# Patient Record
Sex: MALE | Race: WHITE | NOT HISPANIC OR LATINO | Employment: FULL TIME | URBAN - METROPOLITAN AREA
[De-identification: names, ages, dates, MRNs, and addresses within clinical notes are randomized per-mention and may not be internally consistent; named-entity substitution may affect disease eponyms.]

---

## 2023-11-07 ENCOUNTER — PHARMACY VISIT (OUTPATIENT)
Dept: PHARMACY | Facility: CLINIC | Age: 53
End: 2023-11-07
Payer: MEDICARE

## 2023-11-07 PROCEDURE — RXMED WILLOW AMBULATORY MEDICATION CHARGE

## 2023-11-07 RX ORDER — SEMAGLUTIDE 0.68 MG/ML
0.25 INJECTION, SOLUTION SUBCUTANEOUS
Qty: 3 ML | Refills: 1 | OUTPATIENT
Start: 2023-11-07 | End: 2024-01-08

## 2023-12-05 ENCOUNTER — PHARMACY VISIT (OUTPATIENT)
Dept: PHARMACY | Facility: CLINIC | Age: 53
End: 2023-12-05
Payer: COMMERCIAL

## 2023-12-05 PROCEDURE — RXMED WILLOW AMBULATORY MEDICATION CHARGE

## 2023-12-05 RX ORDER — BLOOD-GLUCOSE SENSOR
EACH MISCELLANEOUS
Qty: 2 EACH | Refills: 2 | OUTPATIENT
Start: 2023-12-05 | End: 2024-01-08

## 2023-12-13 PROCEDURE — RXMED WILLOW AMBULATORY MEDICATION CHARGE

## 2023-12-18 ENCOUNTER — PHARMACY VISIT (OUTPATIENT)
Dept: PHARMACY | Facility: CLINIC | Age: 53
End: 2023-12-18
Payer: MEDICARE

## 2023-12-27 PROCEDURE — RXMED WILLOW AMBULATORY MEDICATION CHARGE

## 2023-12-28 ENCOUNTER — PHARMACY VISIT (OUTPATIENT)
Dept: PHARMACY | Facility: CLINIC | Age: 53
End: 2023-12-28
Payer: COMMERCIAL

## 2024-01-15 ENCOUNTER — PHARMACY VISIT (OUTPATIENT)
Dept: PHARMACY | Facility: CLINIC | Age: 54
End: 2024-01-15
Payer: COMMERCIAL

## 2024-01-15 PROCEDURE — RXMED WILLOW AMBULATORY MEDICATION CHARGE

## 2024-01-15 RX ORDER — BLOOD-GLUCOSE SENSOR
EACH MISCELLANEOUS
Qty: 2 EACH | Refills: 2 | OUTPATIENT
Start: 2024-01-15 | End: 2024-03-08

## 2024-01-15 RX ORDER — SEMAGLUTIDE 0.68 MG/ML
INJECTION, SOLUTION SUBCUTANEOUS
Qty: 9 ML | Refills: 1 | OUTPATIENT
Start: 2024-01-15 | End: 2024-04-25 | Stop reason: ALTCHOICE

## 2024-02-09 DIAGNOSIS — N52.1 ERECTILE DYSFUNCTION DUE TO DISEASES CLASSIFIED ELSEWHERE: Primary | ICD-10-CM

## 2024-02-10 PROCEDURE — RXMED WILLOW AMBULATORY MEDICATION CHARGE

## 2024-02-12 RX ORDER — TADALAFIL 10 MG/1
TABLET ORAL
Qty: 10 TABLET | Refills: 0 | Status: SHIPPED | OUTPATIENT
Start: 2024-02-12 | End: 2024-04-25

## 2024-02-14 ENCOUNTER — PHARMACY VISIT (OUTPATIENT)
Dept: PHARMACY | Facility: CLINIC | Age: 54
End: 2024-02-14
Payer: COMMERCIAL

## 2024-02-14 PROCEDURE — RXMED WILLOW AMBULATORY MEDICATION CHARGE

## 2024-02-14 RX ORDER — SEMAGLUTIDE 1.34 MG/ML
1 INJECTION, SOLUTION SUBCUTANEOUS
Qty: 3 ML | Refills: 1 | OUTPATIENT
Start: 2024-02-14 | End: 2024-03-08

## 2024-03-08 ENCOUNTER — PHARMACY VISIT (OUTPATIENT)
Dept: PHARMACY | Facility: CLINIC | Age: 54
End: 2024-03-08
Payer: MEDICARE

## 2024-03-08 PROCEDURE — RXMED WILLOW AMBULATORY MEDICATION CHARGE

## 2024-03-08 RX ORDER — BLOOD-GLUCOSE SENSOR
EACH MISCELLANEOUS
Qty: 6 EACH | Refills: 3 | OUTPATIENT
Start: 2024-03-08

## 2024-03-08 RX ORDER — SEMAGLUTIDE 2.68 MG/ML
INJECTION, SOLUTION SUBCUTANEOUS
Qty: 6 ML | Refills: 1 | OUTPATIENT
Start: 2024-03-08

## 2024-03-18 ENCOUNTER — TELEPHONE (OUTPATIENT)
Dept: SPORTS MEDICINE | Facility: CLINIC | Age: 54
End: 2024-03-18
Payer: COMMERCIAL

## 2024-03-18 NOTE — TELEPHONE ENCOUNTER
The following Rx was called into patient's pharmacy per Dr. Li: Amoxicillin 500 mg take 1 pill 2x/day x 10 days, dispense 20, 1 refill. Patient notified.

## 2024-04-08 ENCOUNTER — APPOINTMENT (OUTPATIENT)
Dept: CARDIOLOGY | Facility: CLINIC | Age: 54
End: 2024-04-08

## 2024-04-25 ENCOUNTER — OFFICE VISIT (OUTPATIENT)
Dept: CARDIOLOGY | Facility: CLINIC | Age: 54
End: 2024-04-25
Payer: COMMERCIAL

## 2024-04-25 VITALS
WEIGHT: 313 LBS | HEART RATE: 87 BPM | SYSTOLIC BLOOD PRESSURE: 100 MMHG | OXYGEN SATURATION: 95 % | DIASTOLIC BLOOD PRESSURE: 70 MMHG | BODY MASS INDEX: 47.59 KG/M2

## 2024-04-25 DIAGNOSIS — I10 ESSENTIAL HYPERTENSION: ICD-10-CM

## 2024-04-25 DIAGNOSIS — I48.0 PAROXYSMAL ATRIAL FIBRILLATION (MULTI): Primary | ICD-10-CM

## 2024-04-25 DIAGNOSIS — E78.49 OTHER HYPERLIPIDEMIA: ICD-10-CM

## 2024-04-25 PROCEDURE — 3074F SYST BP LT 130 MM HG: CPT | Performed by: INTERNAL MEDICINE

## 2024-04-25 PROCEDURE — 99213 OFFICE O/P EST LOW 20 MIN: CPT | Performed by: INTERNAL MEDICINE

## 2024-04-25 PROCEDURE — 1036F TOBACCO NON-USER: CPT | Performed by: INTERNAL MEDICINE

## 2024-04-25 PROCEDURE — 3078F DIAST BP <80 MM HG: CPT | Performed by: INTERNAL MEDICINE

## 2024-04-25 RX ORDER — TRAMADOL HYDROCHLORIDE 50 MG/1
50 TABLET ORAL
COMMUNITY
Start: 2021-03-24

## 2024-04-25 RX ORDER — VALSARTAN AND HYDROCHLOROTHIAZIDE 320; 25 MG/1; MG/1
1 TABLET, FILM COATED ORAL DAILY
COMMUNITY
Start: 2024-03-08

## 2024-04-25 RX ORDER — AMLODIPINE BESYLATE 5 MG/1
5 TABLET ORAL DAILY
COMMUNITY

## 2024-04-25 RX ORDER — ASPIRIN 81 MG/1
81 TABLET ORAL DAILY
COMMUNITY

## 2024-04-25 RX ORDER — FUROSEMIDE 20 MG/1
20 TABLET ORAL DAILY
COMMUNITY
Start: 2024-04-08

## 2024-04-25 RX ORDER — METOPROLOL SUCCINATE 100 MG/1
100 TABLET, EXTENDED RELEASE ORAL DAILY
COMMUNITY
End: 2024-05-29 | Stop reason: SDUPTHER

## 2024-04-25 RX ORDER — ATORVASTATIN CALCIUM 40 MG/1
40 TABLET, FILM COATED ORAL DAILY
COMMUNITY
Start: 2024-03-08

## 2024-04-25 RX ORDER — TAMSULOSIN HYDROCHLORIDE 0.4 MG/1
0.4 CAPSULE ORAL DAILY
COMMUNITY
Start: 2023-10-10

## 2024-04-25 RX ORDER — PROPAFENONE HYDROCHLORIDE 150 MG/1
150 TABLET, COATED ORAL DAILY
COMMUNITY
End: 2024-05-22 | Stop reason: SDUPTHER

## 2024-04-25 RX ORDER — ALLOPURINOL 100 MG/1
100 TABLET ORAL
COMMUNITY
Start: 2024-03-08

## 2024-04-25 ASSESSMENT — ENCOUNTER SYMPTOMS
FEVER: 0
IRREGULAR HEARTBEAT: 0
CLAUDICATION: 0
DIAPHORESIS: 0
COUGH: 0
DIZZINESS: 0
ORTHOPNEA: 0
SHORTNESS OF BREATH: 0
MYALGIAS: 0
WEAKNESS: 0
WHEEZING: 0
WEIGHT GAIN: 0
PND: 0
DYSPNEA ON EXERTION: 0
SYNCOPE: 0
NEAR-SYNCOPE: 0
PALPITATIONS: 0
WEIGHT LOSS: 0

## 2024-04-25 ASSESSMENT — PAIN SCALES - GENERAL: PAINLEVEL: 0-NO PAIN

## 2024-04-25 NOTE — ASSESSMENT & PLAN NOTE
Stable. Continue rhythm control. Hgb A1c in October was 7.8, down to 5.8 recently with wt loss and ozempic. Nonetheless is diabetic as of Oct 2023. [id number] is 2. Will resume Eliquis

## 2024-04-25 NOTE — PROGRESS NOTES
Subjective      Chief Complaint   Patient presents with    6 month f/u        54 yo male with h/o HTN and PAF presents for f/u. He is at substantial risk for atherosclerotic disease. We elected to perform a coronary artery calcium score with the thought of continuing oral anticoagulation with a chads 2 Vasc score of 2 if his coronary artery calcium score was substantial. It was 12, we stopped AC. When I saw him last in May, he was having more atrial fibrillation. We placed him on Propafenone with plans for electrical cardioversion if necessary. I saw him last in September he was doing well. He still works, umpires baseball and softball. No limitations         Review of Systems   Constitutional: Negative for diaphoresis, fever, weight gain and weight loss.   Eyes:  Negative for visual disturbance.   Cardiovascular:  Negative for chest pain, claudication, dyspnea on exertion, irregular heartbeat, leg swelling, near-syncope, orthopnea, palpitations, paroxysmal nocturnal dyspnea and syncope.   Respiratory:  Negative for cough, shortness of breath and wheezing.    Musculoskeletal:  Negative for muscle weakness and myalgias.   Neurological:  Negative for dizziness and weakness.   All other systems reviewed and are negative.       No past medical history on file.     No past surgical history on file.     Social History     Socioeconomic History    Marital status:      Spouse name: Not on file    Number of children: Not on file    Years of education: Not on file    Highest education level: Not on file   Occupational History    Not on file   Tobacco Use    Smoking status: Never    Smokeless tobacco: Never   Substance and Sexual Activity    Alcohol use: Not Currently    Drug use: Never    Sexual activity: Not on file   Other Topics Concern    Not on file   Social History Narrative    Not on file     Social Determinants of Health     Financial Resource Strain: Low Risk  (10/16/2023)    Received from [facility] [employer]     Overall Financial Resource Strain (CARDIA)     Difficulty of Paying Living Expenses: Not hard at all   Food Insecurity: No Food Insecurity (10/16/2023)    Received from [facility]    Hunger Vital Sign     Worried About Running Out of Food in the Last Year: Never true     Ran Out of Food in the Last Year: Never true   Transportation Needs: No Transportation Needs (10/16/2023)    Received from [facility]    PRAPARE - Transportation     Lack of Transportation (Medical): No     Lack of Transportation (Non-Medical): No   Physical Activity: Insufficiently Active (10/16/2023)    Received from [facility]    Exercise Vital Sign     Days of Exercise per Week: 1 day     Minutes of Exercise per Session: 30 min   Stress: No Stress Concern Present (10/16/2023)    Received from [facility]    [employer] [place] [employer] - Occupational Stress Questionnaire     Feeling of Stress : Not at all   Social Connections: Moderately Integrated (10/16/2023)    Received from [facility]    Social Connection and Isolation Panel [NHANES]     Frequency of Communication with Friends and Family: Three times a week     Frequency of Social Gatherings with Friends and Family: Once a week     Attends Hoahaoism Services: More than 4 times per year     Active Member of Clubs or Organizations: Yes     Attends Club or Organization Meetings: 1 to 4 times per year     Marital Status:    Intimate Partner Violence: Not At Risk (10/16/2023)    Received from [facility]    Humiliation, Afraid, Rape, and Kick questionnaire     Fear of Current or Ex-Partner: No     Emotionally Abused: No     Physically Abused: No     Sexually Abused: No   Housing Stability: Low Risk  (10/16/2023)    Received from [facility]    Housing Stability Vital Sign     Unable to Pay for Housing in the Last Year: No     Number of Places Lived in the Last Year: 1     Unstable Housing in the Last Year: No        No family history on file.      OBJECTIVE:    Vitals:    04/25/24 0857   BP: 100/70   Pulse: 87   SpO2: 95%        Vitals reviewed.   Constitutional:       Appearance: Normal and healthy appearance. Not in distress.   Pulmonary:      Effort: Pulmonary effort is normal.      Breath sounds: Normal breath sounds.   Cardiovascular:      Normal rate. Regular rhythm. Normal S1. Normal S2.       Murmurs: There is no murmur.      No gallop.  No click.   Pulses:     Intact distal pulses.   Edema:     Peripheral edema absent.   Skin:     General: Skin is warm and dry.   Neurological:      General: No focal deficit present.          Lab Review:   Lab Results   Component Value Date     01/29/2023    K 3.9 01/29/2023     01/29/2023    CO2 23 (L) 01/29/2023    BUN 22 01/29/2023    CREATININE 0.8 01/29/2023    GLUCOSE 148 (H) 01/29/2023    CALCIUM 9.2 01/29/2023     Lab Results   Component Value Date    CHOL 146 12/16/2019    TRIG 114 12/16/2019    HDL 34 (L) 12/16/2019       Lab Results   Component Value Date    LDLCALC 89 12/16/2019        Paroxysmal atrial fibrillation (Multi)  Stable. Continue rhythm control. Hgb A1c in October was 7.8, down to 5.8 recently with wt loss and ozempic. Nonetheless is diabetic as of Oct 2023. [id number] is 2. Will resume Eliquis    Essential hypertension  Controlled. Continue current medical therapy

## 2024-05-07 PROCEDURE — RXMED WILLOW AMBULATORY MEDICATION CHARGE

## 2024-05-16 ENCOUNTER — PHARMACY VISIT (OUTPATIENT)
Dept: PHARMACY | Facility: CLINIC | Age: 54
End: 2024-05-16
Payer: MEDICARE

## 2024-05-22 DIAGNOSIS — I48.0 PAROXYSMAL ATRIAL FIBRILLATION (MULTI): Primary | ICD-10-CM

## 2024-05-22 RX ORDER — PROPAFENONE HYDROCHLORIDE 150 MG/1
150 TABLET, COATED ORAL DAILY
Qty: 90 TABLET | Refills: 3 | Status: SHIPPED | OUTPATIENT
Start: 2024-05-22 | End: 2025-05-22

## 2024-05-29 DIAGNOSIS — I48.0 PAROXYSMAL ATRIAL FIBRILLATION (MULTI): Primary | ICD-10-CM

## 2024-05-29 RX ORDER — METOPROLOL SUCCINATE 100 MG/1
100 TABLET, EXTENDED RELEASE ORAL DAILY
Qty: 90 TABLET | Refills: 3 | Status: SHIPPED | OUTPATIENT
Start: 2024-05-29

## 2024-06-12 PROCEDURE — RXMED WILLOW AMBULATORY MEDICATION CHARGE

## 2024-06-13 ENCOUNTER — PHARMACY VISIT (OUTPATIENT)
Dept: PHARMACY | Facility: CLINIC | Age: 54
End: 2024-06-13
Payer: MEDICARE

## 2024-06-13 RX ORDER — FUROSEMIDE 20 MG/1
20 TABLET ORAL DAILY
Qty: 90 TABLET | Refills: 0 | OUTPATIENT
Start: 2024-06-13

## 2024-06-13 RX ORDER — BLOOD-GLUCOSE SENSOR
EACH MISCELLANEOUS
Qty: 6 EACH | Refills: 3 | OUTPATIENT
Start: 2024-06-13

## 2024-07-05 PROCEDURE — RXMED WILLOW AMBULATORY MEDICATION CHARGE

## 2024-07-06 ENCOUNTER — PHARMACY VISIT (OUTPATIENT)
Dept: PHARMACY | Facility: CLINIC | Age: 54
End: 2024-07-06
Payer: MEDICARE

## 2024-07-11 ENCOUNTER — TELEPHONE (OUTPATIENT)
Dept: SPORTS MEDICINE | Facility: CLINIC | Age: 54
End: 2024-07-11
Payer: COMMERCIAL

## 2024-07-11 NOTE — TELEPHONE ENCOUNTER
Called in Amoxicillin 500 mg take one pill twice a day x 10 days dispense 20 pills, 1 refill to patient's preferred pharmacy ([facility] per Dr. Li. Patient notified of above.

## 2024-07-16 PROCEDURE — RXMED WILLOW AMBULATORY MEDICATION CHARGE

## 2024-07-17 ENCOUNTER — PHARMACY VISIT (OUTPATIENT)
Dept: PHARMACY | Facility: CLINIC | Age: 54
End: 2024-07-17
Payer: MEDICARE

## 2024-09-05 ENCOUNTER — PHARMACY VISIT (OUTPATIENT)
Dept: PHARMACY | Facility: CLINIC | Age: 54
End: 2024-09-05
Payer: MEDICARE

## 2024-09-05 PROCEDURE — RXMED WILLOW AMBULATORY MEDICATION CHARGE

## 2024-09-06 ENCOUNTER — PHARMACY VISIT (OUTPATIENT)
Dept: PHARMACY | Facility: CLINIC | Age: 54
End: 2024-09-06

## 2024-10-02 PROCEDURE — RXMED WILLOW AMBULATORY MEDICATION CHARGE

## 2024-10-04 ENCOUNTER — PHARMACY VISIT (OUTPATIENT)
Dept: PHARMACY | Facility: CLINIC | Age: 54
End: 2024-10-04
Payer: MEDICARE

## 2024-11-04 DIAGNOSIS — J32.9 CHRONIC RECURRENT SINUSITIS: Primary | ICD-10-CM

## 2024-11-04 RX ORDER — AMOXICILLIN 500 MG/1
500 TABLET, FILM COATED ORAL EVERY 12 HOURS SCHEDULED
Qty: 20 TABLET | Refills: 0 | Status: SHIPPED | OUTPATIENT
Start: 2024-11-04 | End: 2024-11-14

## 2024-11-04 NOTE — PROGRESS NOTES
History of chronic sinusitis recently flared up.  Went to the urgent care was given a Z-Zenon and did not help.  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  Posterior nasal drip noted.    Amoxicillin 500 mg 1 pill twice a day for 10 days dispense 20 with 1 refill    Will follow-up in the office or with PCP in 2 to 3 weeks   bones(Osteoporosis,prev fx,steroid use,metastatic bone ca) impairments found/functional limitations in following categories

## 2024-11-13 PROCEDURE — RXMED WILLOW AMBULATORY MEDICATION CHARGE

## 2024-11-15 PROCEDURE — RXMED WILLOW AMBULATORY MEDICATION CHARGE

## 2024-11-19 ENCOUNTER — OFFICE VISIT (OUTPATIENT)
Dept: CARDIOLOGY | Facility: CLINIC | Age: 54
End: 2024-11-19
Payer: COMMERCIAL

## 2024-11-19 VITALS
BODY MASS INDEX: 49.26 KG/M2 | OXYGEN SATURATION: 97 % | WEIGHT: 315 LBS | HEART RATE: 88 BPM | SYSTOLIC BLOOD PRESSURE: 122 MMHG | DIASTOLIC BLOOD PRESSURE: 80 MMHG

## 2024-11-19 DIAGNOSIS — I48.0 PAROXYSMAL ATRIAL FIBRILLATION (MULTI): ICD-10-CM

## 2024-11-19 DIAGNOSIS — I10 ESSENTIAL HYPERTENSION: Primary | ICD-10-CM

## 2024-11-19 DIAGNOSIS — E78.49 OTHER HYPERLIPIDEMIA: ICD-10-CM

## 2024-11-19 PROCEDURE — 99214 OFFICE O/P EST MOD 30 MIN: CPT | Performed by: INTERNAL MEDICINE

## 2024-11-19 PROCEDURE — 1036F TOBACCO NON-USER: CPT | Performed by: INTERNAL MEDICINE

## 2024-11-19 PROCEDURE — 3074F SYST BP LT 130 MM HG: CPT | Performed by: INTERNAL MEDICINE

## 2024-11-19 PROCEDURE — 93005 ELECTROCARDIOGRAM TRACING: CPT | Performed by: INTERNAL MEDICINE

## 2024-11-19 PROCEDURE — 3079F DIAST BP 80-89 MM HG: CPT | Performed by: INTERNAL MEDICINE

## 2024-11-19 PROCEDURE — 93010 ELECTROCARDIOGRAM REPORT: CPT | Performed by: INTERNAL MEDICINE

## 2024-11-19 RX ORDER — AMLODIPINE BESYLATE 5 MG/1
5 TABLET ORAL DAILY
Qty: 90 TABLET | Refills: 3 | Status: SHIPPED | OUTPATIENT
Start: 2024-11-19

## 2024-11-19 ASSESSMENT — ENCOUNTER SYMPTOMS
DIAPHORESIS: 0
LOSS OF SENSATION IN FEET: 0
WEIGHT GAIN: 0
COUGH: 0
WHEEZING: 0
SYNCOPE: 0
CLAUDICATION: 0
DYSPNEA ON EXERTION: 0
SHORTNESS OF BREATH: 0
WEAKNESS: 0
PALPITATIONS: 0
DEPRESSION: 0
OCCASIONAL FEELINGS OF UNSTEADINESS: 0
NEAR-SYNCOPE: 0
MYALGIAS: 0
ORTHOPNEA: 0
IRREGULAR HEARTBEAT: 0
FEVER: 0
WEIGHT LOSS: 0
DIZZINESS: 0
PND: 0

## 2024-11-19 ASSESSMENT — PAIN SCALES - GENERAL: PAINLEVEL_OUTOF10: 0-NO PAIN

## 2024-11-19 ASSESSMENT — PATIENT HEALTH QUESTIONNAIRE - PHQ9
2. FEELING DOWN, DEPRESSED OR HOPELESS: NOT AT ALL
SUM OF ALL RESPONSES TO PHQ9 QUESTIONS 1 AND 2: 0
1. LITTLE INTEREST OR PLEASURE IN DOING THINGS: NOT AT ALL

## 2024-11-19 NOTE — PROGRESS NOTES
Subjective      Chief Complaint   Patient presents with   • Follow-up        55 yo male with h/o HTN and PAF presents for f/u. He is at substantial risk for atherosclerotic disease. We elected to perform a coronary artery calcium score with the thought of continuing oral anticoagulation with a chads 2 Vasc score of 2 if his coronary artery calcium score was substantial. It was 12, we stopped AC.  We started him on propafenone for rhythm control, he was last seen in September 2023 doing well from a cardiac standpoint.       Review of Systems   Constitutional: Negative for diaphoresis, fever, weight gain and weight loss.   Eyes:  Negative for visual disturbance.   Cardiovascular:  Negative for chest pain, claudication, dyspnea on exertion, irregular heartbeat, leg swelling, near-syncope, orthopnea, palpitations, paroxysmal nocturnal dyspnea and syncope.   Respiratory:  Negative for cough, shortness of breath and wheezing.    Musculoskeletal:  Negative for muscle weakness and myalgias.   Neurological:  Negative for dizziness and weakness.   All other systems reviewed and are negative.       History reviewed. No pertinent past medical history.     History reviewed. No pertinent surgical history.     Social History     Socioeconomic History   • Marital status:      Spouse name: Not on file   • Number of children: Not on file   • Years of education: Not on file   • Highest education level: Not on file   Occupational History   • Not on file   Tobacco Use   • Smoking status: Never   • Smokeless tobacco: Never   Substance and Sexual Activity   • Alcohol use: Not Currently   • Drug use: Never   • Sexual activity: Not on file   Other Topics Concern   • Not on file   Social History Narrative   • Not on file     Social Drivers of Health     Financial Resource Strain: Low Risk  (10/16/2023)    Received from [facility]    Overall Financial Resource Strain (CARDIA)    • Difficulty of Paying Living  Expenses: Not hard at all   Food Insecurity: No Food Insecurity (10/16/2023)    [facility]    Hunger Vital Sign    • Worried About Running Out of Food in the Last Year: Never true    • Ran Out of Food in the Last Year: Never true   Transportation Needs: No Transportation Needs (10/16/2023)    [facility]    PRAPARE - Transportation    • Lack of Transportation (Medical): No    • Lack of Transportation (Non-Medical): No   Physical Activity: Insufficiently Active (10/16/2023)    [facility]    Exercise Vital Sign    • Days of Exercise per Week: 1 day    • Minutes of Exercise per Session: 30 min   Stress: No Stress Concern Present (10/16/2023)    Received from [facility] [place] [facility] - Occupational Stress Questionnaire    • Feeling of Stress : Not at all   Social Connections: Moderately Integrated (10/16/2023)    [facility]    Social Connection and Isolation Panel [NHANES]    • Frequency of Communication with Friends and Family: Three times a week    • Frequency of Social Gatherings with Friends and Family: Once a week    • Attends [employer] Services: More than 4 times per year    • Active Member of Clubs or Organizations: Yes    • Attends Club or Organization Meetings: 1 to 4 times per year    • Marital Status:    Intimate Partner Violence: Not At Risk (10/16/2023)    [facility]    Humiliation, Afraid, Rape, and Kick questionnaire    • Fear of Current or Ex-Partner: No    • Emotionally Abused: No    • Physically Abused: No    • Sexually Abused: No   Housing Stability: Low Risk  (10/16/2023)    [facility]    Housing Stability Vital Sign    • Unable to Pay for Housing in the Last Year: No    • Number of Places Lived in the Last Year: 1    • Unstable  Housing in the Last Year: No        No family history on file.     OBJECTIVE:    Vitals:    11/19/24 1303   BP: 122/80   Pulse: 88   SpO2: 97%        Vitals reviewed.   Constitutional:       Appearance: Normal and healthy appearance. Not in distress.   Pulmonary:      Effort: Pulmonary effort is normal.      Breath sounds: Normal breath sounds.   Cardiovascular:      Normal rate. Regular rhythm. Normal S1. Normal S2.       Murmurs: There is no murmur.      No gallop.  No click.   Pulses:     Intact distal pulses.   Edema:     Peripheral edema absent.   Skin:     General: Skin is warm and dry.   Neurological:      General: No focal deficit present.        Lab Review:   Lab Results   Component Value Date     01/29/2023    K 3.9 01/29/2023     01/29/2023    CO2 23 (L) 01/29/2023    BUN 22 01/29/2023    CREATININE 0.8 01/29/2023    GLUCOSE 148 (H) 01/29/2023    CALCIUM 9.2 01/29/2023     Lab Results   Component Value Date    CHOL 146 12/16/2019    TRIG 114 12/16/2019    HDL 34 (L) 12/16/2019       Lab Results   Component Value Date    LDLCALC 89 12/16/2019        Essential hypertension  Controlled. Continue norvasc    Paroxysmal atrial fibrillation (Multi)  Doing well with rhythm control. Checking an ECG. Continue propafenone and eliquis for oral AC

## 2024-11-27 ENCOUNTER — PHARMACY VISIT (OUTPATIENT)
Dept: PHARMACY | Facility: CLINIC | Age: 54
End: 2024-11-27
Payer: MEDICARE

## 2024-12-03 ENCOUNTER — APPOINTMENT (OUTPATIENT)
Dept: CARDIOLOGY | Facility: CLINIC | Age: 54
End: 2024-12-03
Payer: COMMERCIAL

## 2025-01-15 PROCEDURE — RXMED WILLOW AMBULATORY MEDICATION CHARGE

## 2025-01-21 ENCOUNTER — PHARMACY VISIT (OUTPATIENT)
Dept: PHARMACY | Facility: CLINIC | Age: 55
End: 2025-01-21
Payer: MEDICARE

## 2025-02-04 PROCEDURE — RXMED WILLOW AMBULATORY MEDICATION CHARGE

## 2025-02-05 ENCOUNTER — PHARMACY VISIT (OUTPATIENT)
Dept: PHARMACY | Facility: CLINIC | Age: 55
End: 2025-02-05
Payer: MEDICARE

## 2025-02-16 PROCEDURE — RXMED WILLOW AMBULATORY MEDICATION CHARGE

## 2025-02-17 DIAGNOSIS — N52.8 OTHER MALE ERECTILE DYSFUNCTION: Primary | ICD-10-CM

## 2025-02-17 RX ORDER — TADALAFIL 10 MG/1
10 TABLET ORAL DAILY PRN
Qty: 30 TABLET | Refills: 11 | Status: SHIPPED | OUTPATIENT
Start: 2025-02-17 | End: 2025-03-19

## 2025-02-17 NOTE — PROGRESS NOTES
Patient called and says he needs a refill on his Cialis to be used as needed.    Cialis take 1/2-1 pill as needed at least 30 minutes before sexual activity  Dispense 30 with 12 refills

## 2025-02-19 ENCOUNTER — PHARMACY VISIT (OUTPATIENT)
Dept: PHARMACY | Facility: CLINIC | Age: 55
End: 2025-02-19
Payer: MEDICARE

## 2025-03-03 PROCEDURE — RXMED WILLOW AMBULATORY MEDICATION CHARGE

## 2025-03-05 ENCOUNTER — PHARMACY VISIT (OUTPATIENT)
Dept: PHARMACY | Facility: CLINIC | Age: 55
End: 2025-03-05
Payer: MEDICARE

## 2025-03-05 RX ORDER — FUROSEMIDE 20 MG/1
20 TABLET ORAL DAILY
Qty: 30 TABLET | Refills: 2 | Status: CANCELLED | OUTPATIENT
Start: 2025-03-05

## 2025-03-13 RX ORDER — FUROSEMIDE 20 MG/1
20 TABLET ORAL DAILY
Qty: 30 TABLET | Refills: 2 | Status: CANCELLED | OUTPATIENT
Start: 2025-03-05

## 2025-04-01 PROCEDURE — RXMED WILLOW AMBULATORY MEDICATION CHARGE

## 2025-04-03 ENCOUNTER — PHARMACY VISIT (OUTPATIENT)
Dept: PHARMACY | Facility: CLINIC | Age: 55
End: 2025-04-03
Payer: MEDICARE

## 2025-04-28 PROCEDURE — RXMED WILLOW AMBULATORY MEDICATION CHARGE

## 2025-04-29 ENCOUNTER — PHARMACY VISIT (OUTPATIENT)
Dept: PHARMACY | Facility: CLINIC | Age: 55
End: 2025-04-29
Payer: MEDICARE

## 2025-05-14 PROCEDURE — RXMED WILLOW AMBULATORY MEDICATION CHARGE

## 2025-05-16 ENCOUNTER — PHARMACY VISIT (OUTPATIENT)
Dept: PHARMACY | Facility: CLINIC | Age: 55
End: 2025-05-16
Payer: COMMERCIAL

## 2025-05-19 DIAGNOSIS — I48.0 PAROXYSMAL ATRIAL FIBRILLATION (MULTI): ICD-10-CM

## 2025-05-20 RX ORDER — PROPAFENONE HYDROCHLORIDE 150 MG/1
150 TABLET, COATED ORAL DAILY
Qty: 90 TABLET | Refills: 3 | Status: SHIPPED | OUTPATIENT
Start: 2025-05-20 | End: 2026-05-20

## 2025-06-06 PROCEDURE — RXMED WILLOW AMBULATORY MEDICATION CHARGE

## 2025-06-09 ENCOUNTER — PHARMACY VISIT (OUTPATIENT)
Dept: PHARMACY | Facility: CLINIC | Age: 55
End: 2025-06-09
Payer: COMMERCIAL

## 2025-06-17 ENCOUNTER — PHARMACY VISIT (OUTPATIENT)
Dept: PHARMACY | Facility: CLINIC | Age: 55
End: 2025-06-17
Payer: COMMERCIAL

## 2025-06-17 PROCEDURE — RXMED WILLOW AMBULATORY MEDICATION CHARGE

## 2025-06-19 ENCOUNTER — APPOINTMENT (OUTPATIENT)
Dept: CARDIOLOGY | Facility: HOSPITAL | Age: 55
DRG: 309 | End: 2025-06-19
Payer: MEDICARE

## 2025-06-19 ENCOUNTER — APPOINTMENT (OUTPATIENT)
Dept: RADIOLOGY | Facility: HOSPITAL | Age: 55
DRG: 309 | End: 2025-06-19
Payer: MEDICARE

## 2025-06-19 ENCOUNTER — HOSPITAL ENCOUNTER (INPATIENT)
Facility: HOSPITAL | Age: 55
LOS: 1 days | Discharge: HOME | DRG: 309 | End: 2025-06-20
Attending: EMERGENCY MEDICINE | Admitting: INTERNAL MEDICINE
Payer: MEDICARE

## 2025-06-19 DIAGNOSIS — I48.0 PAROXYSMAL ATRIAL FIBRILLATION (MULTI): ICD-10-CM

## 2025-06-19 DIAGNOSIS — I10 DIASTOLIC HYPERTENSION: ICD-10-CM

## 2025-06-19 DIAGNOSIS — R07.9 CHEST PAIN, UNSPECIFIED TYPE: ICD-10-CM

## 2025-06-19 DIAGNOSIS — R06.00 DYSPNEA, UNSPECIFIED TYPE: ICD-10-CM

## 2025-06-19 DIAGNOSIS — I48.91 ATRIAL FIBRILLATION WITH RAPID VENTRICULAR RESPONSE (MULTI): Primary | ICD-10-CM

## 2025-06-19 LAB
ALBUMIN SERPL BCP-MCNC: 3.9 G/DL (ref 3.4–5)
ALP SERPL-CCNC: 98 U/L (ref 33–120)
ALT SERPL W P-5'-P-CCNC: 49 U/L (ref 10–52)
ANION GAP SERPL CALCULATED.3IONS-SCNC: 12 MMOL/L (ref 10–20)
AORTIC VALVE PEAK VELOCITY: 0.99 M/S
AST SERPL W P-5'-P-CCNC: 35 U/L (ref 9–39)
AV PEAK GRADIENT: 4 MMHG
AVA (PEAK VEL): 3.29 CM2
BASOPHILS # BLD AUTO: 0.01 X10*3/UL (ref 0–0.1)
BASOPHILS NFR BLD AUTO: 0.1 %
BILIRUB SERPL-MCNC: 0.7 MG/DL (ref 0–1.2)
BNP SERPL-MCNC: 61 PG/ML (ref 0–99)
BUN SERPL-MCNC: 20 MG/DL (ref 6–23)
CALCIUM SERPL-MCNC: 8.8 MG/DL (ref 8.6–10.3)
CARDIAC TROPONIN I PNL SERPL HS: 7 NG/L (ref 0–20)
CARDIAC TROPONIN I PNL SERPL HS: 8 NG/L (ref 0–20)
CHLORIDE SERPL-SCNC: 103 MMOL/L (ref 98–107)
CO2 SERPL-SCNC: 28 MMOL/L (ref 21–32)
CREAT SERPL-MCNC: 1.04 MG/DL (ref 0.5–1.3)
D DIMER PPP FEU-MCNC: 0.2 MG/L FEU (ref 0.19–0.5)
EGFRCR SERPLBLD CKD-EPI 2021: 86 ML/MIN/1.73M*2
EJECTION FRACTION APICAL 4 CHAMBER: 47.2
EJECTION FRACTION: 68 %
EOSINOPHIL # BLD AUTO: 0.19 X10*3/UL (ref 0–0.7)
EOSINOPHIL NFR BLD AUTO: 2.2 %
ERYTHROCYTE [DISTWIDTH] IN BLOOD BY AUTOMATED COUNT: 14.2 % (ref 11.5–14.5)
GLUCOSE SERPL-MCNC: 136 MG/DL (ref 74–99)
HCT VFR BLD AUTO: 44.9 % (ref 41–52)
HGB BLD-MCNC: 14.8 G/DL (ref 13.5–17.5)
IMM GRANULOCYTES # BLD AUTO: 0.04 X10*3/UL (ref 0–0.7)
IMM GRANULOCYTES NFR BLD AUTO: 0.5 % (ref 0–0.9)
LACTATE SERPL-SCNC: 1.8 MMOL/L (ref 0.4–2)
LEFT ATRIUM VOLUME AREA LENGTH INDEX BSA: 26 ML/M2
LEFT VENTRICLE INTERNAL DIMENSION DIASTOLE: 4.35 CM (ref 3.5–6)
LEFT VENTRICULAR OUTFLOW TRACT DIAMETER: 2.36 CM
LV EJECTION FRACTION BIPLANE: 43 %
LYMPHOCYTES # BLD AUTO: 1.18 X10*3/UL (ref 1.2–4.8)
LYMPHOCYTES NFR BLD AUTO: 13.5 %
MCH RBC QN AUTO: 26.3 PG (ref 26–34)
MCHC RBC AUTO-ENTMCNC: 33 G/DL (ref 32–36)
MCV RBC AUTO: 80 FL (ref 80–100)
MONOCYTES # BLD AUTO: 0.79 X10*3/UL (ref 0.1–1)
MONOCYTES NFR BLD AUTO: 9.1 %
NEUTROPHILS # BLD AUTO: 6.51 X10*3/UL (ref 1.2–7.7)
NEUTROPHILS NFR BLD AUTO: 74.6 %
NRBC BLD-RTO: 0 /100 WBCS (ref 0–0)
PLATELET # BLD AUTO: 193 X10*3/UL (ref 150–450)
POTASSIUM SERPL-SCNC: 3.5 MMOL/L (ref 3.5–5.3)
PROT SERPL-MCNC: 6.6 G/DL (ref 6.4–8.2)
Q ONSET: 221 MS
QRS COUNT: 22 BEATS
QRS DURATION: 88 MS
QT INTERVAL: 310 MS
QTC CALCULATION(BAZETT): 465 MS
QTC FREDERICIA: 406 MS
R AXIS: 66 DEGREES
RBC # BLD AUTO: 5.63 X10*6/UL (ref 4.5–5.9)
RIGHT VENTRICLE PEAK SYSTOLIC PRESSURE: 21 MMHG
SODIUM SERPL-SCNC: 139 MMOL/L (ref 136–145)
T AXIS: 35 DEGREES
T OFFSET: 376 MS
VENTRICULAR RATE: 135 BPM
WBC # BLD AUTO: 8.7 X10*3/UL (ref 4.4–11.3)

## 2025-06-19 PROCEDURE — 2550000001 HC RX 255 CONTRASTS: Performed by: EMERGENCY MEDICINE

## 2025-06-19 PROCEDURE — 71275 CT ANGIOGRAPHY CHEST: CPT

## 2025-06-19 PROCEDURE — 83605 ASSAY OF LACTIC ACID: CPT | Performed by: EMERGENCY MEDICINE

## 2025-06-19 PROCEDURE — 84443 ASSAY THYROID STIM HORMONE: CPT | Performed by: REGISTERED NURSE

## 2025-06-19 PROCEDURE — 93306 TTE W/DOPPLER COMPLETE: CPT | Performed by: INTERNAL MEDICINE

## 2025-06-19 PROCEDURE — 74174 CTA ABD&PLVS W/CONTRAST: CPT | Performed by: RADIOLOGY

## 2025-06-19 PROCEDURE — 71045 X-RAY EXAM CHEST 1 VIEW: CPT

## 2025-06-19 PROCEDURE — 83735 ASSAY OF MAGNESIUM: CPT | Performed by: REGISTERED NURSE

## 2025-06-19 PROCEDURE — 84484 ASSAY OF TROPONIN QUANT: CPT | Performed by: EMERGENCY MEDICINE

## 2025-06-19 PROCEDURE — 99291 CRITICAL CARE FIRST HOUR: CPT | Mod: 25

## 2025-06-19 PROCEDURE — 2500000004 HC RX 250 GENERAL PHARMACY W/ HCPCS (ALT 636 FOR OP/ED): Performed by: EMERGENCY MEDICINE

## 2025-06-19 PROCEDURE — 83880 ASSAY OF NATRIURETIC PEPTIDE: CPT | Performed by: EMERGENCY MEDICINE

## 2025-06-19 PROCEDURE — 96376 TX/PRO/DX INJ SAME DRUG ADON: CPT

## 2025-06-19 PROCEDURE — 2500000004 HC RX 250 GENERAL PHARMACY W/ HCPCS (ALT 636 FOR OP/ED)

## 2025-06-19 PROCEDURE — 71275 CT ANGIOGRAPHY CHEST: CPT | Performed by: RADIOLOGY

## 2025-06-19 PROCEDURE — 85025 COMPLETE CBC W/AUTO DIFF WBC: CPT | Performed by: EMERGENCY MEDICINE

## 2025-06-19 PROCEDURE — 96375 TX/PRO/DX INJ NEW DRUG ADDON: CPT

## 2025-06-19 PROCEDURE — 82947 ASSAY GLUCOSE BLOOD QUANT: CPT

## 2025-06-19 PROCEDURE — 2500000001 HC RX 250 WO HCPCS SELF ADMINISTERED DRUGS (ALT 637 FOR MEDICARE OP): Performed by: NURSE PRACTITIONER

## 2025-06-19 PROCEDURE — 76376 3D RENDER W/INTRP POSTPROCES: CPT

## 2025-06-19 PROCEDURE — 99222 1ST HOSP IP/OBS MODERATE 55: CPT | Performed by: REGISTERED NURSE

## 2025-06-19 PROCEDURE — 36415 COLL VENOUS BLD VENIPUNCTURE: CPT | Performed by: EMERGENCY MEDICINE

## 2025-06-19 PROCEDURE — 84075 ASSAY ALKALINE PHOSPHATASE: CPT | Performed by: EMERGENCY MEDICINE

## 2025-06-19 PROCEDURE — 2500000004 HC RX 250 GENERAL PHARMACY W/ HCPCS (ALT 636 FOR OP/ED): Performed by: NURSE PRACTITIONER

## 2025-06-19 PROCEDURE — 99223 1ST HOSP IP/OBS HIGH 75: CPT | Performed by: NURSE PRACTITIONER

## 2025-06-19 PROCEDURE — 2500000001 HC RX 250 WO HCPCS SELF ADMINISTERED DRUGS (ALT 637 FOR MEDICARE OP): Performed by: EMERGENCY MEDICINE

## 2025-06-19 PROCEDURE — 2500000005 HC RX 250 GENERAL PHARMACY W/O HCPCS

## 2025-06-19 PROCEDURE — 2060000001 HC INTERMEDIATE ICU ROOM DAILY

## 2025-06-19 PROCEDURE — 93005 ELECTROCARDIOGRAM TRACING: CPT

## 2025-06-19 PROCEDURE — 96361 HYDRATE IV INFUSION ADD-ON: CPT

## 2025-06-19 PROCEDURE — 85300 ANTITHROMBIN III ACTIVITY: CPT | Performed by: EMERGENCY MEDICINE

## 2025-06-19 PROCEDURE — 96365 THER/PROPH/DIAG IV INF INIT: CPT

## 2025-06-19 RX ORDER — DILTIAZEM HCL/D5W 125 MG/125
5-15 PLASTIC BAG, INJECTION (ML) INTRAVENOUS CONTINUOUS
Status: DISCONTINUED | OUTPATIENT
Start: 2025-06-19 | End: 2025-06-20

## 2025-06-19 RX ORDER — HYDROCHLOROTHIAZIDE 25 MG/1
25 TABLET ORAL DAILY
Status: DISCONTINUED | OUTPATIENT
Start: 2025-06-20 | End: 2025-06-20 | Stop reason: HOSPADM

## 2025-06-19 RX ORDER — ASPIRIN 325 MG
325 TABLET ORAL ONCE
Status: COMPLETED | OUTPATIENT
Start: 2025-06-19 | End: 2025-06-19

## 2025-06-19 RX ORDER — ATORVASTATIN CALCIUM 40 MG/1
40 TABLET, FILM COATED ORAL DAILY
Status: DISCONTINUED | OUTPATIENT
Start: 2025-06-20 | End: 2025-06-20 | Stop reason: HOSPADM

## 2025-06-19 RX ORDER — PROPAFENONE HYDROCHLORIDE 150 MG/1
150 TABLET, COATED ORAL DAILY
Status: DISCONTINUED | OUTPATIENT
Start: 2025-06-20 | End: 2025-06-20

## 2025-06-19 RX ORDER — TAMSULOSIN HYDROCHLORIDE 0.4 MG/1
0.4 CAPSULE ORAL DAILY
Status: DISCONTINUED | OUTPATIENT
Start: 2025-06-20 | End: 2025-06-20 | Stop reason: HOSPADM

## 2025-06-19 RX ORDER — OMEPRAZOLE 20 MG/1
20 TABLET, DELAYED RELEASE ORAL
COMMUNITY

## 2025-06-19 RX ORDER — CYANOCOBALAMIN (VITAMIN B-12) 500 MCG
10 TABLET ORAL DAILY
COMMUNITY

## 2025-06-19 RX ORDER — MAGNESIUM SULFATE 1 G/100ML
1 INJECTION INTRAVENOUS ONCE
Status: COMPLETED | OUTPATIENT
Start: 2025-06-19 | End: 2025-06-19

## 2025-06-19 RX ORDER — DILTIAZEM HYDROCHLORIDE 5 MG/ML
10 INJECTION INTRAVENOUS ONCE
Status: COMPLETED | OUTPATIENT
Start: 2025-06-19 | End: 2025-06-19

## 2025-06-19 RX ORDER — MULTIVIT-MIN/IRON FUM/FOLIC AC 7.5 MG-4
1 TABLET ORAL DAILY
COMMUNITY

## 2025-06-19 RX ORDER — AMLODIPINE BESYLATE 5 MG/1
5 TABLET ORAL DAILY
Status: DISCONTINUED | OUTPATIENT
Start: 2025-06-20 | End: 2025-06-20 | Stop reason: HOSPADM

## 2025-06-19 RX ORDER — METOPROLOL SUCCINATE 100 MG/1
100 TABLET, EXTENDED RELEASE ORAL DAILY
Status: DISCONTINUED | OUTPATIENT
Start: 2025-06-20 | End: 2025-06-20 | Stop reason: HOSPADM

## 2025-06-19 RX ORDER — DILTIAZEM HCL/D5W 125 MG/125
5-15 PLASTIC BAG, INJECTION (ML) INTRAVENOUS CONTINUOUS
Status: DISCONTINUED | OUTPATIENT
Start: 2025-06-19 | End: 2025-06-19

## 2025-06-19 RX ORDER — VALSARTAN 160 MG/1
320 TABLET ORAL DAILY
Status: DISCONTINUED | OUTPATIENT
Start: 2025-06-20 | End: 2025-06-20 | Stop reason: HOSPADM

## 2025-06-19 RX ORDER — POTASSIUM CHLORIDE 1.5 G/1.58G
40 POWDER, FOR SOLUTION ORAL ONCE
Status: COMPLETED | OUTPATIENT
Start: 2025-06-19 | End: 2025-06-19

## 2025-06-19 RX ORDER — FUROSEMIDE 20 MG/1
20 TABLET ORAL DAILY
Status: DISCONTINUED | OUTPATIENT
Start: 2025-06-20 | End: 2025-06-20 | Stop reason: HOSPADM

## 2025-06-19 RX ORDER — FAMOTIDINE 10 MG/ML
20 INJECTION, SOLUTION INTRAVENOUS ONCE
Status: COMPLETED | OUTPATIENT
Start: 2025-06-19 | End: 2025-06-19

## 2025-06-19 RX ORDER — ALLOPURINOL 100 MG/1
100 TABLET ORAL DAILY
Status: DISCONTINUED | OUTPATIENT
Start: 2025-06-20 | End: 2025-06-20 | Stop reason: HOSPADM

## 2025-06-19 RX ADMIN — IOHEXOL 120 ML: 350 INJECTION, SOLUTION INTRAVENOUS at 10:34

## 2025-06-19 RX ADMIN — Medication 5 MG/HR: at 16:00

## 2025-06-19 RX ADMIN — IBUTILIDE FUMARATE 1 MG: 0.1 INJECTION, SOLUTION INTRAVENOUS at 12:41

## 2025-06-19 RX ADMIN — POTASSIUM CHLORIDE 40 MEQ: 1.5 POWDER, FOR SOLUTION ORAL at 12:00

## 2025-06-19 RX ADMIN — ASPIRIN 325 MG ORAL TABLET 325 MG: 325 PILL ORAL at 08:58

## 2025-06-19 RX ADMIN — FAMOTIDINE 20 MG: 10 INJECTION, SOLUTION INTRAVENOUS at 08:59

## 2025-06-19 RX ADMIN — DILTIAZEM HYDROCHLORIDE 10 MG: 5 INJECTION, SOLUTION INTRAVENOUS at 08:58

## 2025-06-19 RX ADMIN — SODIUM CHLORIDE 500 ML: 900 INJECTION, SOLUTION INTRAVENOUS at 08:58

## 2025-06-19 RX ADMIN — DILTIAZEM HYDROCHLORIDE 10 MG: 5 INJECTION, SOLUTION INTRAVENOUS at 10:05

## 2025-06-19 RX ADMIN — APIXABAN 5 MG: 5 TABLET, FILM COATED ORAL at 20:43

## 2025-06-19 RX ADMIN — IBUTILIDE FUMARATE 1 MG: 0.1 INJECTION, SOLUTION INTRAVENOUS at 13:13

## 2025-06-19 RX ADMIN — MAGNESIUM SULFATE IN DEXTROSE 1 G: 10 INJECTION, SOLUTION INTRAVENOUS at 12:03

## 2025-06-19 ASSESSMENT — HEART SCORE
HEART SCORE: 1
RISK FACTORS: NO KNOWN RISK FACTORS
AGE: 45-64
TROPONIN: LESS THAN OR EQUAL TO NORMAL LIMIT
HISTORY: SLIGHTLY SUSPICIOUS
ECG: NORMAL

## 2025-06-19 ASSESSMENT — PAIN SCALES - GENERAL: PAINLEVEL_OUTOF10: 0 - NO PAIN

## 2025-06-19 ASSESSMENT — ENCOUNTER SYMPTOMS
PALPITATIONS: 1
SHORTNESS OF BREATH: 1

## 2025-06-19 ASSESSMENT — PAIN - FUNCTIONAL ASSESSMENT: PAIN_FUNCTIONAL_ASSESSMENT: 0-10

## 2025-06-19 NOTE — ED PROVIDER NOTES
HPI   Chief Complaint   Patient presents with    Rapid Heart Rate       Patient is a 54-year-old male with past medical history of atrial fibrillation on Eliquis and metoprolol presenting with concerns for chest pain.  He states that he woke 24-hour shift and around 1230 felt some elevated heart rate.  Did end up falling asleep but then at 4 AM when he woke up he states that he knew he was in A-fib.  This happened him previously.  He does take Eliquis and metoprolol.  He did take it this morning.  States he was having some midsternal chest pain in association with his A-fib.  He states that through the Cardizem 10 mg is what helps bring her back to sinus rhythm.  He states typically anymore that will significant lower his blood pressure.  Usually when he is in A-fib.  He has low blood pressure.              Patient History   Medical History[1]  Surgical History[2]  Family History[3]  Social History[4]    Physical Exam   ED Triage Vitals   Temperature Heart Rate Respirations BP   06/19/25 0831 06/19/25 0831 06/19/25 0831 06/19/25 0833   36.5 °C (97.7 °F) (!) 142 18 108/85      Pulse Ox Temp Source Heart Rate Source Patient Position   06/19/25 0831 06/19/25 0831 06/19/25 0831 06/19/25 0831   99 % Temporal Monitor Sitting      BP Location FiO2 (%)     06/19/25 0831 --     Right arm        Physical Exam  Vitals and nursing note reviewed.   Constitutional:       Appearance: He is well-developed.      Comments: Awake, very pleasant, laying in examination bed   HENT:      Head: Normocephalic and atraumatic.      Nose: Nose normal.      Mouth/Throat:      Mouth: Mucous membranes are moist.      Pharynx: Oropharynx is clear.   Eyes:      Extraocular Movements: Extraocular movements intact.      Conjunctiva/sclera: Conjunctivae normal.      Pupils: Pupils are equal, round, and reactive to light.   Cardiovascular:      Rate and Rhythm: Tachycardia present. Rhythm irregular.      Pulses: Normal pulses.      Heart sounds: Normal  heart sounds. No murmur heard.  Pulmonary:      Effort: Pulmonary effort is normal. No respiratory distress.      Breath sounds: Normal breath sounds.   Abdominal:      General: Abdomen is flat.      Palpations: Abdomen is soft.      Tenderness: There is no abdominal tenderness.   Musculoskeletal:         General: No swelling. Normal range of motion.      Cervical back: Normal range of motion and neck supple.   Skin:     General: Skin is warm and dry.      Capillary Refill: Capillary refill takes less than 2 seconds.   Neurological:      General: No focal deficit present.      Mental Status: He is alert and oriented to person, place, and time.   Psychiatric:         Mood and Affect: Mood normal.         Behavior: Behavior normal.           ED Course & MDM   ED Course as of 06/19/25 1709   Thu Jun 19, 2025   0842 Patient does not want viral swab.  Viral swabs are canceled. [AR]   0859 Patient tells attending physician that he has left shoulder pain that radiates into his left chest.  Will order CTA. [AR]   1149 We are going to chemically cardiovert after discussions with cardiology HILARIO.  Recommendation is magnesium and oral potassium.  Waiting 30 minutes and then giving first dose of Corvert.  If that does not work then to give second dose of Corvert.  If patient does convert, needs to wait 2 hours prior to discharge.  If these do not work, patient needs to be on a drip and then will have to be admitted. [AR]      ED Course User Index  [AR] Mumtaz Owen PA-C         Diagnoses as of 06/19/25 1709   Atrial fibrillation with rapid ventricular response (Multi)   Dyspnea, unspecified type   Chest pain, unspecified type   Diastolic hypertension                 No data recorded     Shefali Coma Scale Score: 15 (06/19/25 1049 : Shelby Alvarez RN) HEART Score: 1 (06/19/25 1707 : Mumtaz Owen PA-C)                         Medical Decision Making  Patient is a 54-year-old male with past medical history of atrial  fibrillation on Eliquis and metoprolol presenting with concerns for chest pain.  Lab work, viral swabs, imaging ordered.  Conditions considered include but are not limited to: ACS, cardiac arrhythmia, pneumonia, viral illness.    I saw this patient in conjunction with Dr. Munguia.   Labwork is unremarkable.  Initial repeat troponin within normal limit.  Patient's heart score of 1.  CT imaging with no acute findings.  Patient was initially given 1 bolus of Cardizem and then a secondary bolus without relief.  As described in ED course, cardiology is going to try to chemically cardiovert.    This was unsuccessful.  Patient has been admitted under Dr. Arnold, of cardiology.  As I deem necessary from the patient's history, physical, auditory and imaging findings as well as ED course, I considered the benefits of diagnoses.    Portions of this note made with Dragon software, please be mindful of potential grammatical errors.      Medications   dilTIAZem (Cardizem) 125 mg in dextrose 5% 125 mL (1 mg/mL) infusion (premix) (5 mg/hr intravenous New Bag 6/19/25 1600)   allopurinol (Zyloprim) tablet 100 mg (has no administration in time range)   amLODIPine (Norvasc) tablet 5 mg (has no administration in time range)   apixaban (Eliquis) tablet 5 mg (has no administration in time range)   atorvastatin (Lipitor) tablet 40 mg (has no administration in time range)   furosemide (Lasix) tablet 20 mg (has no administration in time range)   metoprolol succinate XL (Toprol-XL) 24 hr tablet 100 mg (has no administration in time range)   propafenone (Rythmol) tablet 150 mg (has no administration in time range)   tamsulosin (Flomax) 24 hr capsule 0.4 mg (has no administration in time range)   dilTIAZem (Cardizem) 125 mg in dextrose 5% 125 mL (1 mg/mL) infusion (premix) (15 mg/hr intravenous Rate/Dose Change 6/19/25 1608)   perflutren lipid microspheres (Definity) injection 0.5-10 mL of dilution (has no administration in time range)    hydroCHLOROthiazide (HYDRODiuril) tablet 25 mg (has no administration in time range)   valsartan (Diovan) tablet 320 mg (has no administration in time range)   dilTIAZem (Cardizem) injection 10 mg (10 mg intravenous Given 6/19/25 0858)   famotidine PF (Pepcid) injection 20 mg (20 mg intravenous Given 6/19/25 0859)   aspirin tablet 325 mg (325 mg oral Given 6/19/25 0858)   sodium chloride 0.9 % bolus 500 mL (0 mL intravenous Stopped 6/19/25 1005)   dilTIAZem (Cardizem) injection 10 mg (10 mg intravenous Given 6/19/25 1005)   iohexol (OMNIPaque) 350 mg iodine/mL solution 120 mL (120 mL intravenous Given 6/19/25 1034)   ibutilide (Corvert) 1 mg in dextrose 5% 50 mL IV (0 mg intravenous Stopped 6/19/25 1251)   potassium chloride (Klor-Con) packet 40 mEq (40 mEq oral Given 6/19/25 1200)   magnesium sulfate 1 g in dextrose 5%  mL (0 g intravenous Stopped 6/19/25 1303)   ibutilide (Corvert) 1 mg in dextrose 5% 50 mL IV (0 mg intravenous Stopped 6/19/25 1323)     Labs Reviewed   CBC WITH AUTO DIFFERENTIAL - Abnormal       Result Value    WBC 8.7      nRBC 0.0      RBC 5.63      Hemoglobin 14.8      Hematocrit 44.9      MCV 80      MCH 26.3      MCHC 33.0      RDW 14.2      Platelets 193      Neutrophils % 74.6      Immature Granulocytes %, Automated 0.5      Lymphocytes % 13.5      Monocytes % 9.1      Eosinophils % 2.2      Basophils % 0.1      Neutrophils Absolute 6.51      Immature Granulocytes Absolute, Automated 0.04      Lymphocytes Absolute 1.18 (*)     Monocytes Absolute 0.79      Eosinophils Absolute 0.19      Basophils Absolute 0.01     COMPREHENSIVE METABOLIC PANEL - Abnormal    Glucose 136 (*)     Sodium 139      Potassium 3.5      Chloride 103      Bicarbonate 28      Anion Gap 12      Urea Nitrogen 20      Creatinine 1.04      eGFR 86      Calcium 8.8      Albumin 3.9      Alkaline Phosphatase 98      Total Protein 6.6      AST 35      Bilirubin, Total 0.7      ALT 49     B-TYPE NATRIURETIC PEPTIDE -  Normal    BNP 61      Narrative:        <100 pg/mL - Heart failure unlikely  100-299 pg/mL - Intermediate probability of acute heart                  failure exacerbation. Correlate with clinical                  context and patient history.    >=300 pg/mL - Heart Failure likely. Correlate with clinical                  context and patient history.    BNP testing is performed using different testing methodology at [facility] than at other [facility]. Direct result comparisons should only be made within the same method.      SERIAL TROPONIN-INITIAL - Normal    Troponin I, High Sensitivity 7      Narrative:     Less than 99th percentile of normal range cutoff-  Female and children under 18 years old <14 ng/L; Male <21 ng/L: Negative  Repeat testing should be performed if clinically indicated.     Female and children under 18 years old 14-50 ng/L; Male 21-50 ng/L:  Consistent with possible cardiac damage and possible increased clinical   risk. Serial measurements may help to assess extent of myocardial damage.     >50 ng/L: Consistent with cardiac damage, increased clinical risk and  myocardial infarction. Serial measurements may help assess extent of   myocardial damage.      NOTE: Children less than 1 year old may have higher baseline troponin   levels and results should be interpreted in conjunction with the overall   clinical context.     NOTE: Troponin I testing is performed using a different   testing methodology at [facility] than at [facility]. Direct result comparisons should only   be made within the same method.   D-DIMER, NON VTE - Normal    D-Dimer Non VTE, Quant (mg/L FEU) 0.20      Narrative:     THROMBOEMBOLIC EVENTS CANNOT BE EXCLUDED SOLELY ON THE BASIS OF THE D-DIMER LEVEL BEING WITHIN THE NORMAL REFERENCE RANGE. D-DIMER LEVELS LESS THAN 0.5 MG/L FEU IN CONJUNCTION WITH A LOW CLINICAL PROBABILITY HAVE AN EXCELLENT NEGATIVE PREDICTIVE VALUE IN EXCLUDING  A DIAGNOSIS OF PULMONARY EMBOLUS (PE) OR DEEP VEIN THROMBOSIS (DVT). ELEVATED D-DIMER LEVELS ARE NOT SPECIFIC TO PE OR DVT, AND MAY BE SEEN IN PATIENTS WITH DIC, ADVANCED AGE, PREGNANCY, MALIGNANCY, LIVER DISEASE, INFECTION, AND INFLAMMATORY CONDITIONS AMONG OTHERS. D-DIMER LEVELS MAY BE DECREASED IN PATIENTS RECEIVING ANTI-COAGULATION THERAPY.   LACTATE - Normal    Lactate 1.8      Narrative:     Venipuncture immediately after or during the administration of Metamizole may lead to falsely low results. Testing should be performed immediately prior to Metamizole dosing.   SERIAL TROPONIN, 1 HOUR - Normal    Troponin I, High Sensitivity 8      Narrative:     Less than 99th percentile of normal range cutoff-  Female and children under 18 years old <14 ng/L; Male <21 ng/L: Negative  Repeat testing should be performed if clinically indicated.     Female and children under 18 years old 14-50 ng/L; Male 21-50 ng/L:  Consistent with possible cardiac damage and possible increased clinical   risk. Serial measurements may help to assess extent of myocardial damage.     >50 ng/L: Consistent with cardiac damage, increased clinical risk and  myocardial infarction. Serial measurements may help assess extent of   myocardial damage.      NOTE: Children less than 1 year old may have higher baseline troponin   levels and results should be interpreted in conjunction with the overall   clinical context.     NOTE: Troponin I testing is performed using a different   testing methodology at [facility] than at other   [facility]. Direct result comparisons should only   be made within the same method.   TROPONIN SERIES- (INITIAL, 1 HR)    Narrative:     The following orders were created for panel order Troponin I Series, High Sensitivity (0, 1 HR).  Procedure                               Abnormality         Status                     ---------                               -----------         ------                      Troponin I, High Sensiti...[[id number]]  Normal              Final result               Troponin, High Sensitivi...[[id number]]  Normal              Final result                 Please view results for these tests on the individual orders.     Transthoracic Echo Complete   Final Result      CT angio chest abdomen pelvis   Final Result   1.  No thoracic or abdominal aortic aneurysm or dissection.   2. No acute abnormality of the chest, abdomen or pelvis.   3. Stable low-density mass about the spleen.        None.        Signed by: Greyson Perez 6/19/2025 11:33 AM   Dictation workstation:   [id number]      XR chest 1 view   Final Result   No acute radiographic abnormality        MACRO:   None.        Signed by: Rocío Garcia 6/19/2025 9:24 AM   Dictation workstation:   [id number]      Electrophysiology procedure    (Results Pending)         Procedure  Critical Care    Performed by: Mumtaz Owen PA-C  Authorized by: Michelle Munguia MD    Critical care provider statement:     Critical care time (minutes):  33    Critical care time was exclusive of:  Separately billable procedures and treating other patients    Critical care was necessary to treat or prevent imminent or life-threatening deterioration of the following conditions: Afib with RVR.    Critical care was time spent personally by me on the following activities:  Blood draw for specimens, development of treatment plan with patient or surrogate, discussions with consultants, evaluation of patient's response to treatment, interpretation of cardiac output measurements, obtaining history from patient or surrogate, ordering and performing treatments and interventions, ordering and review of radiographic studies, ordering and review of laboratory studies, pulse oximetry, re-evaluation of patient's condition and review of old charts    I assumed direction of critical care for this patient from another provider in my specialty: no      Care discussed with:  admitting provider           [1]   Past Medical History:  Diagnosis Date    Atrial fibrillation (Multi)     Diabetes mellitus (Multi)     Hyperlipidemia     Hypertension     Sleep apnea    [2] History reviewed. No pertinent surgical history.  [3] No family history on file.  [4]   Social History  Tobacco Use    Smoking status: Never    Smokeless tobacco: Never   Substance Use Topics    Alcohol use: Not Currently     Alcohol/week: 6.0 standard drinks of alcohol     Types: 6 Cans of beer per week    Drug use: Never        Mumtaz Owen PA-C  06/19/25 5170

## 2025-06-19 NOTE — LETTER
June 20, 2025    Patient: Chinmay Tee   YOB: 1970   Date of Visit: 6/19/2025       To Whom It May Concern:    Chinmay Tee was seen and treated at [facility] on 6/19/2025 through 6/20/2025.  He may return to work on 6/24/2025.  Upon return to work he has no work restrictions and may work in a full capacity.       If you have any questions or concerns, please don't hesitate to call.    Anthony Johnson [facility]  PERLITA cardiology  [facility]  [id number] [place] [id number].               CC: No Recipients

## 2025-06-19 NOTE — PROGRESS NOTES
Attestation/Supervisory note for ANN MARIE Owen      The patient is a 54-year-old male presenting to the emergency department for evaluation of substernal chest pain, left-sided thoracic back pain, malaise and fatigue and shortness of breath.  The patient states that he does have a history of atrial fibrillation and woke up around 0030 this morning feeling like that his heart rate was rapid.  He states that he tried to go back to bed but woke up again around 0400 because of the pain.  He states that he does take Eliquis and metoprolol daily for treatment of his atrial fibrillation.  He states he has been seen in the emergency room for similar symptoms the past and Cardizem was given with resolution of his symptoms.  He states that the chest pain is substernal but he states he does have some pain rating from the left upper back into his chest.  It is a dull aching pain.  No specific better or worse.  He denies any cough or congestion.  No fever or chills.  No headache or visual changes.  No focal weakness or numbness.  No nausea, vomiting or diarrhea.  No abdominal pain.  No urinary complaints.  No history of CAD or ACS.  No history of PE or DVT.  All pertinent positives and negatives are recorded above.  All other systems reviewed and otherwise negative.  Vital signs with diastolic hypertension and tachycardia but otherwise within normal limits.  Physical exam with a well-nourished well-developed male in no acute distress.  HEENT exam within normal limits.  He has no evidence of airway compromise or respiratory distress but does have an irregularly irregular heartbeat with tachycardia.  Abdominal exam is benign.  He does not have any gross motor, neurologic or Vassy deficits on exam.  Pulses are equal bilaterally.      EKG with atrial fibrillation at 135 bpm, normal axis, normal voltage, normal ST segment, and a slight flattening of the T waves in the inferior leads      IV Pepcid, oral aspirin, IV diltiazem and IV  fluids ordered      Diagnostic labs without significant abnormality      Initial troponin 7.  Repeat troponin 8      Cxr  IMPRESSION:  No acute radiographic abnormality      CT angio chest abd pelvis  IMPRESSION:  1.  No thoracic or abdominal aortic aneurysm or dissection.  2. No acute abnormality of the chest, abdomen or pelvis.  3. Stable low-density mass about the spleen.      The patient does not have any evidence of airway compromise or respiratory distress on exam and is well-perfused on exam.  He does have evidence of rapid A-fib on his EKG and on the monitor.  No evidence of a STEMI on EKG or cardiac enzymes.  No other events on telemetry other than the rapid A-fib.  IV diltiazem was given.  Diagnostic labs without significant abnormality.  Chest x-ray without acute process.  No evidence of pneumonia or pneumothorax.  No evidence of CHF.  No widening of the mediastinum.  His pulses are equal bilaterally.  CT angio chest shows no evidence of PE or dissection.  No evidence of pneumonia or pneumothorax.  No evidence of CHF.  CT abdomen pelvis without significant abnormality other than a stable low-density mass about the spleen.  The patient was seen by cardiology in the emergency department.  They did attempt chemical cardioversion with magnesium and oral potassium followed by convert but did not have significant improvement in his heart rate.  They did recommend admission for further management and electrical cardioversion and echocardiogram while in the hospital.  The patient was agreeable with this plan and was admitted for further management.      Impression/diagnosis:  Atrial fibrillation with rapid ventricular response  Dyspnea, dyspnea on exertion  Substernal chest pain  Diastolic hypertension      Critical care time of 16 minutes billed for management of rapid A-fib, monitoring of the patient on telemetry, consultation with cardiology, initiation of IV diltiazem, initiation of chemical cardioversion and  arrangement for admission.  This time excludes time for billable procedures.      critical care time billed for by me is non concurrent with time billed for by ANN MARIE Owen      I personally saw the patient and made/approve the management plan and take responsibility for the patient management.      I independently interpreted the following study (S) EKG and diagnostic labs      I personally discussed the patient's management with the patient      I reviewed the results of the diagnostic labs and diagnostic imaging.  Formal radiology read was completed by the radiologist.      Michelle Munguia MD

## 2025-06-19 NOTE — PROGRESS NOTES
Spiritual Care Visit  Spiritual Care Request    Reason for Visit:  Routine Visit: Introduction  Crisis Visit: ED     Request Received From:       Focus of Care:  Visited With: Patient and family together         Refer to :          Spiritual Care Assessment    Spiritual Assessment:                      Care Provided:  Intended Effects: Build relationship of care and support, Convey a calming presence, Demonstrate caring and concern    Sense of Community and or Restorationist Affiliation:  Baptist   Values/Beliefs  Spiritual Requests During Hospitalization: Chinmay asked to be anointed.     Addressed Needs/Concerns and/or [place] Through:     Sacramental Encounters  Sacrament of Sick-Anointing: Anointed    Outcome:        Advance Directives:         Spiritual Care Annotation    Annotation:  Chinmay asked to be anointed.  Wife=Jennifer Jesus

## 2025-06-19 NOTE — ED PROCEDURE NOTE
Procedure  Critical Care    Performed by: Michelle Munguia MD  Authorized by: Michelle Munguia MD    Critical care provider statement:     Critical care time (minutes):  16    Critical care time was exclusive of:  Teaching time and separately billable procedures and treating other patients    Critical care was necessary to treat or prevent imminent or life-threatening deterioration of the following conditions: Rapid atrial fibrillation.    Critical care was time spent personally by me on the following activities:  Obtaining history from patient or surrogate, examination of patient, evaluation of patient's response to treatment, discussions with consultants, development of treatment plan with patient or surrogate, blood draw for specimens, ordering and performing treatments and interventions, ordering and review of laboratory studies, ordering and review of radiographic studies and pulse oximetry    Care discussed with: admitting provider    Comments:      Critical care time of 16 minutes billed for management of rapid A-fib, monitoring of the patient on telemetry, consultation with cardiology, initiation of IV diltiazem, initiation of chemical cardioversion and arrangement for admission.  This time excludes time for billable procedures.      critical care time billed for by me is non concurrent with time billed for by ANN MARIE Munguia MD  06/19/25 4324

## 2025-06-19 NOTE — H&P
History Of Present Illness  Chinmay Tee is a 54 y.o. male presenting with palpitation shortness of breath.  Current with Dr. Bowser.  Past medical history of paroxysmal atrial fibrillation, sleep apnea compliant with CPAP, morbid obesity, diabetes mellitus type 2 managed with Ozempic  most recent A1c 5.9, hypertensive disorder.  Patient states he was on a trip where he was stressed, ate poorly, slept poorly, and indulged in alcohol.  Believes he converted back into an atrial fibrillation at that point.  States he has been in a sinus rhythm for over a year.  He was scheduled for an electrical cardioversion last year and the morning of the cardioversion was found to be in sinus rhythm at that point was initiated on propafenone in combination with his metoprolol.  He reports compliance with his medications and states he has been compliant with his apixaban and has not missed a dose for at least the last 30 days.  EKG in the emergency department per my interpretation reveals a rapid atrial fibrillation at 140 bpm.  Received 2 dosages of 10 mg of IV diltiazem bolus which resulted in improved rate control of atrial fibrillation into the low 100s but hypotension.  He had a CT chest angio in the emergency department without acute findings.  After this my service was consulted and we recommended an attempt at  chemical cardioversion using ibutilide.  He received potassium 40 mEq oral prior to this as well as 1 g of IV magnesium.  He did receive 2 dosages of 1 mg of ibutilide which were ineffective in converting the patient to sinus rhythm.  I initiated a 5 mg/h diltiazem drip and the patient was admitted on telemetry for further testing and treatment.  Note the patient denies complaints of chest pain or pressure, nausea, vomiting, or diaphoresis.     Past Medical History  Medical History[1]    Surgical History  Surgical History[2]     Social History  He reports that he has never smoked. He has never used smokeless tobacco.  "He reports that he does not currently use alcohol after a past usage of about 6.0 standard drinks of alcohol per week. He reports that he does not use drugs.    Family History  Family History[3]     Allergies  Patient has no allergy information on record.    Review of Systems   Respiratory:  Positive for shortness of breath.    Cardiovascular:  Positive for palpitations.        Physical Exam  Vitals reviewed.   Constitutional:       General: He is not in acute distress.     Appearance: He is obese. He is not ill-appearing or toxic-appearing.   HENT:      Head: Normocephalic and atraumatic.      Nose: Nose normal.      Mouth/Throat:      Mouth: Mucous membranes are moist.      Pharynx: Oropharynx is clear.   Cardiovascular:      Rate and Rhythm: Tachycardia present. Rhythm irregular.      Pulses: Normal pulses.      Heart sounds: Normal heart sounds. No murmur heard.     No friction rub. No gallop.   Pulmonary:      Effort: Pulmonary effort is normal.      Breath sounds: Normal breath sounds.   Abdominal:      General: Bowel sounds are normal.      Palpations: Abdomen is soft.   Musculoskeletal:         General: Normal range of motion.      Cervical back: Normal range of motion.      Right lower leg: No edema.      Left lower leg: No edema.   Skin:     General: Skin is warm and dry.      Capillary Refill: Capillary refill takes less than 2 seconds.   Neurological:      Mental Status: He is alert and oriented to person, place, and time. Mental status is at baseline.   Psychiatric:         Mood and Affect: Mood normal.         Behavior: Behavior normal.         Thought Content: Thought content normal.         Judgment: Judgment normal.        Last Recorded Vitals  Blood pressure 114/86, pulse (!) 105, temperature 36.5 °C (97.7 °F), temperature source Temporal, resp. rate (!) 22, height 1.727 m (5' 8\"), weight 147 kg (324 lb), SpO2 99%.    Relevant Results  Results for orders placed or performed during the hospital " encounter of 06/19/25 (from the past 24 hours)   ECG 12 lead   Result Value Ref Range    Ventricular Rate 135 BPM    QRS Duration 88 ms    QT Interval 310 ms    QTC Calculation(Bazett) 465 ms    R Axis 66 degrees    T Axis 35 degrees    QRS Count 22 beats    Q Onset 221 ms    T Offset 376 ms    QTC Fredericia 406 ms   CBC and Auto Differential   Result Value Ref Range    WBC 8.7 4.4 - 11.3 x10*3/uL    nRBC 0.0 0.0 - 0.0 /100 WBCs    RBC 5.63 4.50 - 5.90 x10*6/uL    Hemoglobin 14.8 13.5 - 17.5 g/dL    Hematocrit 44.9 41.0 - 52.0 %    MCV 80 80 - 100 fL    MCH 26.3 26.0 - 34.0 pg    MCHC 33.0 32.0 - 36.0 g/dL    RDW 14.2 11.5 - 14.5 %    Platelets 193 150 - 450 x10*3/uL    Neutrophils % 74.6 40.0 - 80.0 %    Immature Granulocytes %, Automated 0.5 0.0 - 0.9 %    Lymphocytes % 13.5 13.0 - 44.0 %    Monocytes % 9.1 2.0 - 10.0 %    Eosinophils % 2.2 0.0 - 6.0 %    Basophils % 0.1 0.0 - 2.0 %    Neutrophils Absolute 6.51 1.20 - 7.70 x10*3/uL    Immature Granulocytes Absolute, Automated 0.04 0.00 - 0.70 x10*3/uL    Lymphocytes Absolute 1.18 (L) 1.20 - 4.80 x10*3/uL    Monocytes Absolute 0.79 0.10 - 1.00 x10*3/uL    Eosinophils Absolute 0.19 0.00 - 0.70 x10*3/uL    Basophils Absolute 0.01 0.00 - 0.10 x10*3/uL   Comprehensive metabolic panel   Result Value Ref Range    Glucose 136 (H) 74 - 99 mg/dL    Sodium 139 136 - 145 mmol/L    Potassium 3.5 3.5 - 5.3 mmol/L    Chloride 103 98 - 107 mmol/L    Bicarbonate 28 21 - 32 mmol/L    Anion Gap 12 10 - 20 mmol/L    Urea Nitrogen 20 6 - 23 mg/dL    Creatinine 1.04 0.50 - 1.30 mg/dL    eGFR 86 >60 mL/min/1.73m*2    Calcium 8.8 8.6 - 10.3 mg/dL    Albumin 3.9 3.4 - 5.0 g/dL    Alkaline Phosphatase 98 33 - 120 U/L    Total Protein 6.6 6.4 - 8.2 g/dL    AST 35 9 - 39 U/L    Bilirubin, Total 0.7 0.0 - 1.2 mg/dL    ALT 49 10 - 52 U/L   B-Type Natriuretic Peptide   Result Value Ref Range    BNP 61 0 - 99 pg/mL   Troponin I, High Sensitivity, Initial   Result Value Ref Range    Troponin I,  High Sensitivity 7 0 - 20 ng/L   D-dimer, quantitative   Result Value Ref Range    D-Dimer Non VTE, Quant (mg/L FEU) 0.20 0.19 - 0.50 mg/L FEU   Lactate   Result Value Ref Range    Lactate 1.8 0.4 - 2.0 mmol/L   Troponin, High Sensitivity, 1 Hour   Result Value Ref Range    Troponin I, High Sensitivity 8 0 - 20 ng/L          Assessment & Plan  Atrial fibrillation with rapid ventricular response (Multi)      Rapid atrial fibrillation: Attempted IV diltiazem usage, ineffective causing mild hypotension.  Compliant with anticoagulation for at least the past 30 days.  Attempted 2 dosages of ibutilide, which were ineffective.  Patient has been placed on diltiazem drip.  Will monitor blood pressure carefully.  N.p.o. after midnight.  Plan for electrical cardioversion if the patient does not spontaneously convert by the morning  Hypertensive disorder: Currently mildly hypotensive as a secondary effect of cardiac medications.  Will monitor carefully.  Most recent blood pressure 110/64  Morbid obesity: Has been encouraged to follow Mediterranean diet.  He was placed on Mounjaro approximately 1 year ago.  Is working on weight loss in the outpatient setting.  Diabetes mellitus type 2: Continues on Mounjaro now.  Takes this once a week.  Does not take any other oral or subcu medications for diabetes.  Most recent A1c 5.9 per my review  Hyperlipidemia: Continue on high potency statin  Sleep apnea compliant with CPAP: Patient prefers his own device and his wife will drop off his CPAP machine this evening.    Overall impression:    6/19: As above.  I was involved with this patient's care at the initial stages in the emergency department.  Trialed IV diltiazem x 2 which was effective in reducing rate but also reduce blood pressure.  Patient did not convert back to sinus rhythm.  After CT angio negative for acute findings the patient received oral potassium as well as IV magnesium followed by 2 dosages of ibutilide which were  ineffective in converting the patient back to sinus rhythm.  He is compliant with his  outpatient medications and states compliance with Rythmol as well as metoprolol and reports that he has not missed a single dose of his apixaban twice a day over the past 30 days.  Plan for echocardiogram this hospitalization to reassess LV.  N.p.o. after midnight.  Plan for electrical cardioversion in the morning.  I have consulted electrophysiology for further planning for this patient and to establish care with anticipation of an cardiac ablation in the outpatient setting in the near future.    I spent 80 minutes in the professional and overall care of this patient.      Anthony Johnson, APRN-CNP         [1]   Past Medical History:  Diagnosis Date    Atrial fibrillation (Multi)     Diabetes mellitus (Multi)     Hyperlipidemia     Hypertension     Sleep apnea    [2] History reviewed. No pertinent surgical history.  [3] No family history on file.

## 2025-06-19 NOTE — PROGRESS NOTES
Pharmacy Medication History Review    Chinmay Tee is a 54 y.o. male admitted for Atrial fibrillation with rapid ventricular response (Multi). Pharmacy reviewed the patient's [id number] medications and allergies for accuracy.    Medications ADDED:  Multivitamin  Omeprazole 20 mg  Vitamin D 400 units  Medications CHANGED:  none  Medications REMOVED:   none     The list below reflects the updated PTA list.   Prior to Admission Medications   Prescriptions Last Dose Informant   FreeStyle Zhane 3 Sensor device     Sig: Change sensor every 14 (fourteen) days   FreeStyle Zhane 3 Sensor device     Sig: Use to monitor blood sugar and change every 14 days.   FreeStyle Zhane 3 Sensor device     Sig: Use daily and change every 14 days.   FreeStyle Zhane 3 Sensor device     Sig: Apply 1 Unit every 14 (fourteen) days   allopurinol (Zyloprim) 100 mg tablet 6/19/2025 Morning Self   Sig: Take 1 tablet (100 mg) by mouth once daily.   amLODIPine (Norvasc) 5 mg tablet 6/19/2025 Morning Self   Sig: Take 1 tablet (5 mg) by mouth once daily.   apixaban (Eliquis) 5 mg tablet 6/19/2025 Morning Self   Sig: Take 1 tablet (5 mg) by mouth 2 times a day.   atorvastatin (Lipitor) 40 mg tablet 6/19/2025 Morning Self   Sig: Take 1 tablet (40 mg) by mouth once daily.   cholecalciferol (Vitamin D3) 10 mcg (400 units) tablet 6/19/2025 Morning Self   Sig: Take 1 tablet (10 mcg) by mouth once daily.   furosemide (Lasix) 20 mg tablet 6/19/2025 Morning Self   Sig: Take 1 tablet (20 mg) by mouth once daily.   metoprolol succinate XL (Toprol-XL) 100 mg 24 hr tablet 6/19/2025 Morning Self   Sig: Take 1 tablet (100 mg) by mouth once daily.   multivitamin with minerals tablet 6/19/2025 Morning Self   Sig: Take 1 tablet by mouth once daily.   omeprazole OTC (PriLOSEC OTC) 20 mg EC tablet 6/19/2025 Morning Self   Sig: Take 1 tablet (20 mg) by mouth once daily in the morning. Take before meals. Do not crush, chew, or split.   propafenone (Rythmol) 150 mg  tablet 6/19/2025 Morning Self   Sig: Take 1 tablet (150 mg) by mouth once daily.   semaglutide (Ozempic) 2 mg/dose (8 mg/3 mL) pen injector  Self   Sig: Inject 2 mg under the skin every 7 (seven) days   Patient not taking: Reported on 11/19/2024   semaglutide (Ozempic) 2 mg/dose (8 mg/3 mL) pen injector 6/15/2025 Self   Sig: Inject 2 mg under the skin every 7 (seven) days   Patient taking differently: Inject 2 mg under the skin 1 (one) time per week. On Sundays   tadalafil (Cialis) 10 mg tablet     Sig: Take 1 tablet (10 mg) by mouth once daily as needed for erectile dysfunction (Take 1/2 to 1 pill at least 30 minutes before sexual activity).   tamsulosin (Flomax) 0.4 mg 24 hr capsule 6/19/2025 Morning Self   Sig: Take 1 capsule (0.4 mg) by mouth once daily.   valsartan-hydrochlorothiazide (Diovan-HCT) 320-25 mg tablet 6/19/2025 Morning Self   Sig: Take 1 tablet by mouth once daily.      Facility-Administered Medications: None        The list below reflects the updated allergy list. Please review each documented allergy for additional clarification and justification.  Allergies  Reviewed by ELVIRA Thompson-CNP on 6/19/2025   No Known Allergies         Patient accepts M2B at discharge.     Sources:   Pharmacy dispense history  Patient Interview Good historian  Chart Review     Additional Comments:  none    Hanny Holloway, PharmD  ED Pharmacist  06/19/25

## 2025-06-20 ENCOUNTER — HOSPITAL ENCOUNTER (OUTPATIENT)
Dept: CARDIOLOGY | Facility: HOSPITAL | Age: 55
Discharge: HOME | End: 2025-06-20
Payer: MEDICARE

## 2025-06-20 ENCOUNTER — ANESTHESIA (OUTPATIENT)
Dept: CARDIOLOGY | Facility: HOSPITAL | Age: 55
End: 2025-06-20
Payer: MEDICARE

## 2025-06-20 ENCOUNTER — ANESTHESIA EVENT (OUTPATIENT)
Dept: CARDIOLOGY | Facility: HOSPITAL | Age: 55
End: 2025-06-20
Payer: MEDICARE

## 2025-06-20 ENCOUNTER — PHARMACY VISIT (OUTPATIENT)
Dept: PHARMACY | Facility: CLINIC | Age: 55
End: 2025-06-20
Payer: COMMERCIAL

## 2025-06-20 VITALS
RESPIRATION RATE: 18 BRPM | TEMPERATURE: 98.3 F | HEIGHT: 68 IN | BODY MASS INDEX: 47.74 KG/M2 | SYSTOLIC BLOOD PRESSURE: 97 MMHG | OXYGEN SATURATION: 96 % | HEART RATE: 77 BPM | WEIGHT: 315 LBS | DIASTOLIC BLOOD PRESSURE: 59 MMHG

## 2025-06-20 DIAGNOSIS — I48.91 ATRIAL FIBRILLATION WITH RAPID VENTRICULAR RESPONSE (MULTI): Primary | ICD-10-CM

## 2025-06-20 PROBLEM — G47.33 OSA (OBSTRUCTIVE SLEEP APNEA): Status: ACTIVE | Noted: 2025-06-20

## 2025-06-20 PROBLEM — Z79.4 TYPE 2 DIABETES MELLITUS, WITH LONG-TERM CURRENT USE OF INSULIN: Status: ACTIVE | Noted: 2025-06-20

## 2025-06-20 PROBLEM — E66.9 OBESITY: Status: ACTIVE | Noted: 2025-06-20

## 2025-06-20 PROBLEM — E11.9 TYPE 2 DIABETES MELLITUS, WITH LONG-TERM CURRENT USE OF INSULIN: Status: ACTIVE | Noted: 2025-06-20

## 2025-06-20 LAB
ABO GROUP (TYPE) IN BLOOD: NORMAL
ANION GAP SERPL CALCULATED.3IONS-SCNC: 10 MMOL/L (ref 10–20)
ANTIBODY SCREEN: NORMAL
BASOPHILS # BLD AUTO: 0.01 X10*3/UL (ref 0–0.1)
BASOPHILS NFR BLD AUTO: 0.1 %
BUN SERPL-MCNC: 20 MG/DL (ref 6–23)
CALCIUM SERPL-MCNC: 8.6 MG/DL (ref 8.6–10.3)
CHLORIDE SERPL-SCNC: 107 MMOL/L (ref 98–107)
CO2 SERPL-SCNC: 27 MMOL/L (ref 21–32)
CREAT SERPL-MCNC: 0.97 MG/DL (ref 0.5–1.3)
EGFRCR SERPLBLD CKD-EPI 2021: >90 ML/MIN/1.73M*2
EOSINOPHIL # BLD AUTO: 0.23 X10*3/UL (ref 0–0.7)
EOSINOPHIL NFR BLD AUTO: 2.7 %
ERYTHROCYTE [DISTWIDTH] IN BLOOD BY AUTOMATED COUNT: 14.6 % (ref 11.5–14.5)
GLUCOSE BLD MANUAL STRIP-MCNC: 178 MG/DL (ref 74–99)
GLUCOSE SERPL-MCNC: 109 MG/DL (ref 74–99)
HCT VFR BLD AUTO: 43.8 % (ref 41–52)
HGB BLD-MCNC: 14.3 G/DL (ref 13.5–17.5)
HOLD SPECIMEN: NORMAL
HOLD SPECIMEN: NORMAL
IMM GRANULOCYTES # BLD AUTO: 0.05 X10*3/UL (ref 0–0.7)
IMM GRANULOCYTES NFR BLD AUTO: 0.6 % (ref 0–0.9)
INR PPP: 1.1 (ref 0.9–1.2)
LYMPHOCYTES # BLD AUTO: 1.16 X10*3/UL (ref 1.2–4.8)
LYMPHOCYTES NFR BLD AUTO: 13.7 %
MAGNESIUM SERPL-MCNC: 1.88 MG/DL (ref 1.6–2.4)
MCH RBC QN AUTO: 26.2 PG (ref 26–34)
MCHC RBC AUTO-ENTMCNC: 32.6 G/DL (ref 32–36)
MCV RBC AUTO: 80 FL (ref 80–100)
MONOCYTES # BLD AUTO: 1 X10*3/UL (ref 0.1–1)
MONOCYTES NFR BLD AUTO: 11.8 %
NEUTROPHILS # BLD AUTO: 5.99 X10*3/UL (ref 1.2–7.7)
NEUTROPHILS NFR BLD AUTO: 71.1 %
NRBC BLD-RTO: 0 /100 WBCS (ref 0–0)
PLATELET # BLD AUTO: 153 X10*3/UL (ref 150–450)
POTASSIUM SERPL-SCNC: 4.1 MMOL/L (ref 3.5–5.3)
PROTHROMBIN TIME: 11.6 SECONDS (ref 9.3–12.7)
RBC # BLD AUTO: 5.46 X10*6/UL (ref 4.5–5.9)
RH FACTOR (ANTIGEN D): NORMAL
SODIUM SERPL-SCNC: 140 MMOL/L (ref 136–145)
TSH SERPL-ACNC: 2.43 MIU/L (ref 0.44–3.98)
WBC # BLD AUTO: 8.4 X10*3/UL (ref 4.4–11.3)

## 2025-06-20 PROCEDURE — RXMED WILLOW AMBULATORY MEDICATION CHARGE

## 2025-06-20 PROCEDURE — 80048 BASIC METABOLIC PNL TOTAL CA: CPT | Performed by: NURSE PRACTITIONER

## 2025-06-20 PROCEDURE — 92960 CARDIOVERSION ELECTRIC EXT: CPT | Performed by: INTERNAL MEDICINE

## 2025-06-20 PROCEDURE — 2500000002 HC RX 250 W HCPCS SELF ADMINISTERED DRUGS (ALT 637 FOR MEDICARE OP, ALT 636 FOR OP/ED): Performed by: NURSE PRACTITIONER

## 2025-06-20 PROCEDURE — 99239 HOSP IP/OBS DSCHRG MGMT >30: CPT | Performed by: NURSE PRACTITIONER

## 2025-06-20 PROCEDURE — 2500000001 HC RX 250 WO HCPCS SELF ADMINISTERED DRUGS (ALT 637 FOR MEDICARE OP): Performed by: REGISTERED NURSE

## 2025-06-20 PROCEDURE — 2720000007 HC OR 272 NO HCPCS: Performed by: INTERNAL MEDICINE

## 2025-06-20 PROCEDURE — 85610 PROTHROMBIN TIME: CPT | Performed by: NURSE PRACTITIONER

## 2025-06-20 PROCEDURE — 86901 BLOOD TYPING SEROLOGIC RH(D): CPT | Performed by: INTERNAL MEDICINE

## 2025-06-20 PROCEDURE — 86850 RBC ANTIBODY SCREEN: CPT | Performed by: INTERNAL MEDICINE

## 2025-06-20 PROCEDURE — 3700000001 HC GENERAL ANESTHESIA TIME - INITIAL BASE CHARGE: Performed by: INTERNAL MEDICINE

## 2025-06-20 PROCEDURE — 93005 ELECTROCARDIOGRAM TRACING: CPT

## 2025-06-20 PROCEDURE — 2500000001 HC RX 250 WO HCPCS SELF ADMINISTERED DRUGS (ALT 637 FOR MEDICARE OP): Performed by: NURSE PRACTITIONER

## 2025-06-20 PROCEDURE — 36415 COLL VENOUS BLD VENIPUNCTURE: CPT | Performed by: INTERNAL MEDICINE

## 2025-06-20 PROCEDURE — 5A2204Z RESTORATION OF CARDIAC RHYTHM, SINGLE: ICD-10-PCS | Performed by: INTERNAL MEDICINE

## 2025-06-20 PROCEDURE — 7100000009 HC PHASE TWO TIME - INITIAL BASE CHARGE: Performed by: INTERNAL MEDICINE

## 2025-06-20 PROCEDURE — 85025 COMPLETE CBC W/AUTO DIFF WBC: CPT | Performed by: NURSE PRACTITIONER

## 2025-06-20 PROCEDURE — 2500000004 HC RX 250 GENERAL PHARMACY W/ HCPCS (ALT 636 FOR OP/ED): Performed by: NURSE ANESTHETIST, CERTIFIED REGISTERED

## 2025-06-20 PROCEDURE — 7100000010 HC PHASE TWO TIME - EACH INCREMENTAL 1 MINUTE: Performed by: INTERNAL MEDICINE

## 2025-06-20 PROCEDURE — 3700000002 HC GENERAL ANESTHESIA TIME - EACH INCREMENTAL 1 MINUTE: Performed by: INTERNAL MEDICINE

## 2025-06-20 RX ORDER — PROPOFOL 10 MG/ML
INJECTION, EMULSION INTRAVENOUS AS NEEDED
Status: DISCONTINUED | OUTPATIENT
Start: 2025-06-20 | End: 2025-06-20

## 2025-06-20 RX ORDER — HYDRALAZINE HYDROCHLORIDE 20 MG/ML
5 INJECTION INTRAMUSCULAR; INTRAVENOUS EVERY 30 MIN PRN
Status: CANCELLED | OUTPATIENT
Start: 2025-06-20

## 2025-06-20 RX ORDER — PROPAFENONE HYDROCHLORIDE 150 MG/1
150 TABLET, COATED ORAL EVERY 8 HOURS
Qty: 270 TABLET | Refills: 3 | Status: SHIPPED | OUTPATIENT
Start: 2025-06-20

## 2025-06-20 RX ORDER — PROPAFENONE HYDROCHLORIDE 150 MG/1
150 TABLET, COATED ORAL 3 TIMES DAILY
Status: DISCONTINUED | OUTPATIENT
Start: 2025-06-20 | End: 2025-06-20 | Stop reason: HOSPADM

## 2025-06-20 RX ORDER — ONDANSETRON HYDROCHLORIDE 2 MG/ML
4 INJECTION, SOLUTION INTRAVENOUS ONCE AS NEEDED
Status: CANCELLED | OUTPATIENT
Start: 2025-06-20

## 2025-06-20 RX ORDER — ALBUTEROL SULFATE 0.83 MG/ML
2.5 SOLUTION RESPIRATORY (INHALATION) ONCE AS NEEDED
Status: CANCELLED | OUTPATIENT
Start: 2025-06-20

## 2025-06-20 RX ORDER — SODIUM CHLORIDE, SODIUM LACTATE, POTASSIUM CHLORIDE, CALCIUM CHLORIDE 600; 310; 30; 20 MG/100ML; MG/100ML; MG/100ML; MG/100ML
100 INJECTION, SOLUTION INTRAVENOUS CONTINUOUS
Status: CANCELLED | OUTPATIENT
Start: 2025-06-20 | End: 2025-06-21

## 2025-06-20 RX ORDER — LIDOCAINE HYDROCHLORIDE 10 MG/ML
0.1 INJECTION, SOLUTION INFILTRATION; PERINEURAL ONCE
Status: CANCELLED | OUTPATIENT
Start: 2025-06-20 | End: 2025-06-20

## 2025-06-20 RX ADMIN — PROPAFENONE HYDROCHLORIDE 150 MG: 150 TABLET, FILM COATED ORAL at 08:39

## 2025-06-20 RX ADMIN — ATORVASTATIN CALCIUM 40 MG: 40 TABLET, FILM COATED ORAL at 08:38

## 2025-06-20 RX ADMIN — METOPROLOL SUCCINATE 100 MG: 100 TABLET, EXTENDED RELEASE ORAL at 08:39

## 2025-06-20 RX ADMIN — FUROSEMIDE 20 MG: 20 TABLET ORAL at 08:39

## 2025-06-20 RX ADMIN — TAMSULOSIN HYDROCHLORIDE 0.4 MG: 0.4 CAPSULE ORAL at 08:38

## 2025-06-20 RX ADMIN — PROPOFOL 140 MG: 10 INJECTION, EMULSION INTRAVENOUS at 12:19

## 2025-06-20 RX ADMIN — APIXABAN 5 MG: 5 TABLET, FILM COATED ORAL at 08:39

## 2025-06-20 RX ADMIN — AMLODIPINE BESYLATE 5 MG: 5 TABLET ORAL at 08:38

## 2025-06-20 RX ADMIN — ALLOPURINOL 100 MG: 100 TABLET ORAL at 08:39

## 2025-06-20 RX ADMIN — HYDROCHLOROTHIAZIDE 25 MG: 25 TABLET ORAL at 08:39

## 2025-06-20 RX ADMIN — VALSARTAN 320 MG: 160 TABLET, FILM COATED ORAL at 08:39

## 2025-06-20 RX ADMIN — SODIUM CHLORIDE: 9 INJECTION, SOLUTION INTRAVENOUS at 12:16

## 2025-06-20 SDOH — ECONOMIC STABILITY: FOOD INSECURITY: HOW HARD IS IT FOR YOU TO PAY FOR THE VERY BASICS LIKE FOOD, HOUSING, MEDICAL CARE, AND HEATING?: NOT HARD AT ALL

## 2025-06-20 SDOH — ECONOMIC STABILITY: TRANSPORTATION INSECURITY: IN THE PAST 12 MONTHS, HAS LACK OF TRANSPORTATION KEPT YOU FROM MEDICAL APPOINTMENTS OR FROM GETTING MEDICATIONS?: NO

## 2025-06-20 SDOH — ECONOMIC STABILITY: HOUSING INSECURITY: IN THE LAST 12 MONTHS, WAS THERE A TIME WHEN YOU WERE NOT ABLE TO PAY THE MORTGAGE OR RENT ON TIME?: NO

## 2025-06-20 SDOH — HEALTH STABILITY: PHYSICAL HEALTH
HOW OFTEN DO YOU NEED TO HAVE SOMEONE HELP YOU WHEN YOU READ INSTRUCTIONS, PAMPHLETS, OR OTHER WRITTEN MATERIAL FROM YOUR DOCTOR OR PHARMACY?: NEVER

## 2025-06-20 SDOH — SOCIAL STABILITY: SOCIAL INSECURITY
WITHIN THE LAST YEAR, HAVE YOU BEEN RAPED OR FORCED TO HAVE ANY KIND OF SEXUAL ACTIVITY BY YOUR PARTNER OR EX-PARTNER?: NO

## 2025-06-20 SDOH — SOCIAL STABILITY: SOCIAL INSECURITY
WITHIN THE LAST YEAR, HAVE YOU BEEN KICKED, HIT, SLAPPED, OR OTHERWISE PHYSICALLY HURT BY YOUR PARTNER OR EX-PARTNER?: NO

## 2025-06-20 SDOH — ECONOMIC STABILITY: HOUSING INSECURITY: AT ANY TIME IN THE PAST 12 MONTHS, WERE YOU HOMELESS OR LIVING IN A SHELTER (INCLUDING NOW)?: NO

## 2025-06-20 SDOH — ECONOMIC STABILITY: HOUSING INSECURITY: IN THE PAST 12 MONTHS, HOW MANY TIMES HAVE YOU MOVED WHERE YOU WERE LIVING?: 0

## 2025-06-20 SDOH — SOCIAL STABILITY: SOCIAL INSECURITY: WITHIN THE LAST YEAR, HAVE YOU BEEN HUMILIATED OR EMOTIONALLY ABUSED IN OTHER WAYS BY YOUR PARTNER OR EX-PARTNER?: NO

## 2025-06-20 SDOH — HEALTH STABILITY: MENTAL HEALTH: CURRENT SMOKER: 0

## 2025-06-20 SDOH — ECONOMIC STABILITY: FOOD INSECURITY: WITHIN THE PAST 12 MONTHS, THE FOOD YOU BOUGHT JUST DIDN'T LAST AND YOU DIDN'T HAVE MONEY TO GET MORE.: NEVER TRUE

## 2025-06-20 SDOH — ECONOMIC STABILITY: FOOD INSECURITY: WITHIN THE PAST 12 MONTHS, YOU WORRIED THAT YOUR FOOD WOULD RUN OUT BEFORE YOU GOT THE MONEY TO BUY MORE.: NEVER TRUE

## 2025-06-20 SDOH — SOCIAL STABILITY: SOCIAL INSECURITY: WITHIN THE LAST YEAR, HAVE YOU BEEN AFRAID OF YOUR PARTNER OR EX-PARTNER?: NO

## 2025-06-20 SDOH — ECONOMIC STABILITY: INCOME INSECURITY: IN THE PAST 12 MONTHS HAS THE ELECTRIC, GAS, OIL, OR WATER COMPANY THREATENED TO SHUT OFF SERVICES IN YOUR HOME?: NO

## 2025-06-20 ASSESSMENT — PAIN SCALES - GENERAL
PAINLEVEL_OUTOF10: 0 - NO PAIN

## 2025-06-20 ASSESSMENT — PAIN - FUNCTIONAL ASSESSMENT
PAIN_FUNCTIONAL_ASSESSMENT: 0-10

## 2025-06-20 ASSESSMENT — ACTIVITIES OF DAILY LIVING (ADL)
LACK_OF_TRANSPORTATION: NO
LACK_OF_TRANSPORTATION: NO

## 2025-06-20 NOTE — PROGRESS NOTES
"Chinmay Tee is a 54 y.o. male on day 1 of admission presenting with Atrial fibrillation with rapid ventricular response (Multi).    Subjective   Alert and oriented.  Remains in rate controlled atrial fibrillation.  Mildly hypotensive.       Objective     Physical Exam  Vitals and nursing note reviewed.   Constitutional:       General: He is not in acute distress.     Appearance: He is obese. He is not ill-appearing or toxic-appearing.   HENT:      Head: Normocephalic and atraumatic.      Nose: Nose normal.      Mouth/Throat:      Mouth: Mucous membranes are moist.      Pharynx: Oropharynx is clear.   Cardiovascular:      Rate and Rhythm: Normal rate. Rhythm irregular.      Pulses: Normal pulses.      Heart sounds: Normal heart sounds. No murmur heard.     No friction rub. No gallop.   Pulmonary:      Effort: Pulmonary effort is normal.      Breath sounds: Normal breath sounds.   Abdominal:      General: Bowel sounds are normal.      Palpations: Abdomen is soft.   Musculoskeletal:         General: Normal range of motion.      Cervical back: Normal range of motion.      Right lower leg: No edema.      Left lower leg: No edema.   Skin:     General: Skin is warm and dry.      Capillary Refill: Capillary refill takes less than 2 seconds.   Neurological:      Mental Status: He is alert and oriented to person, place, and time. Mental status is at baseline.   Psychiatric:         Mood and Affect: Mood normal.         Behavior: Behavior normal.         Thought Content: Thought content normal.         Judgment: Judgment normal.         Last Recorded Vitals  Blood pressure 101/85, pulse 80, temperature 36.5 °C (97.7 °F), temperature source Temporal, resp. rate 19, height 1.727 m (5' 8\"), weight 147 kg (324 lb), SpO2 97%.  Intake/Output last 3 Shifts:  I/O last 3 completed shifts:  In: 160 (1.1 mL/kg) [I.V.:100 (0.7 mL/kg); IV Piggyback:60]  Out: - (0 mL/kg)   Weight: 147 kg     Relevant Results  Results for orders placed or " performed during the hospital encounter of 06/19/25 (from the past 24 hours)   ECG 12 lead   Result Value Ref Range    Ventricular Rate 135 BPM    QRS Duration 88 ms    QT Interval 310 ms    QTC Calculation(Bazett) 465 ms    R Axis 66 degrees    T Axis 35 degrees    QRS Count 22 beats    Q Onset 221 ms    T Offset 376 ms    QTC Fredericia 406 ms   CBC and Auto Differential   Result Value Ref Range    WBC 8.7 4.4 - 11.3 x10*3/uL    nRBC 0.0 0.0 - 0.0 /100 WBCs    RBC 5.63 4.50 - 5.90 x10*6/uL    Hemoglobin 14.8 13.5 - 17.5 g/dL    Hematocrit 44.9 41.0 - 52.0 %    MCV 80 80 - 100 fL    MCH 26.3 26.0 - 34.0 pg    MCHC 33.0 32.0 - 36.0 g/dL    RDW 14.2 11.5 - 14.5 %    Platelets 193 150 - 450 x10*3/uL    Neutrophils % 74.6 40.0 - 80.0 %    Immature Granulocytes %, Automated 0.5 0.0 - 0.9 %    Lymphocytes % 13.5 13.0 - 44.0 %    Monocytes % 9.1 2.0 - 10.0 %    Eosinophils % 2.2 0.0 - 6.0 %    Basophils % 0.1 0.0 - 2.0 %    Neutrophils Absolute 6.51 1.20 - 7.70 x10*3/uL    Immature Granulocytes Absolute, Automated 0.04 0.00 - 0.70 x10*3/uL    Lymphocytes Absolute 1.18 (L) 1.20 - 4.80 x10*3/uL    Monocytes Absolute 0.79 0.10 - 1.00 x10*3/uL    Eosinophils Absolute 0.19 0.00 - 0.70 x10*3/uL    Basophils Absolute 0.01 0.00 - 0.10 x10*3/uL   Comprehensive metabolic panel   Result Value Ref Range    Glucose 136 (H) 74 - 99 mg/dL    Sodium 139 136 - 145 mmol/L    Potassium 3.5 3.5 - 5.3 mmol/L    Chloride 103 98 - 107 mmol/L    Bicarbonate 28 21 - 32 mmol/L    Anion Gap 12 10 - 20 mmol/L    Urea Nitrogen 20 6 - 23 mg/dL    Creatinine 1.04 0.50 - 1.30 mg/dL    eGFR 86 >60 mL/min/1.73m*2    Calcium 8.8 8.6 - 10.3 mg/dL    Albumin 3.9 3.4 - 5.0 g/dL    Alkaline Phosphatase 98 33 - 120 U/L    Total Protein 6.6 6.4 - 8.2 g/dL    AST 35 9 - 39 U/L    Bilirubin, Total 0.7 0.0 - 1.2 mg/dL    ALT 49 10 - 52 U/L   B-Type Natriuretic Peptide   Result Value Ref Range    BNP 61 0 - 99 pg/mL   Troponin I, High Sensitivity, Initial   Result  Value Ref Range    Troponin I, High Sensitivity 7 0 - 20 ng/L   D-dimer, quantitative   Result Value Ref Range    D-Dimer Non VTE, Quant (mg/L FEU) 0.20 0.19 - 0.50 mg/L FEU   Lactate   Result Value Ref Range    Lactate 1.8 0.4 - 2.0 mmol/L   Troponin, High Sensitivity, 1 Hour   Result Value Ref Range    Troponin I, High Sensitivity 8 0 - 20 ng/L   TSH with reflex to Free T4 if abnormal   Result Value Ref Range    Thyroid Stimulating Hormone 2.43 0.44 - 3.98 mIU/L   Magnesium   Result Value Ref Range    Magnesium 1.88 1.60 - 2.40 mg/dL   Transthoracic Echo Complete   Result Value Ref Range    AV pk sharon 0.99 m/s    LVOT diam 2.36 cm    LV Biplane EF 43 %    LA vol index A/L 26.0 ml/m2    LV EF 68 %    RVSP 21 mmHg    LVIDd 4.35 cm    AV pk grad 4 mmHg    Aortic Valve Area by Continuity of Peak Velocity 3.29 cm2    LV A4C EF 47.2    POCT GLUCOSE   Result Value Ref Range    POCT Glucose 178 (H) 74 - 99 mg/dL   CBC and Auto Differential   Result Value Ref Range    WBC 8.4 4.4 - 11.3 x10*3/uL    nRBC 0.0 0.0 - 0.0 /100 WBCs    RBC 5.46 4.50 - 5.90 x10*6/uL    Hemoglobin 14.3 13.5 - 17.5 g/dL    Hematocrit 43.8 41.0 - 52.0 %    MCV 80 80 - 100 fL    MCH 26.2 26.0 - 34.0 pg    MCHC 32.6 32.0 - 36.0 g/dL    RDW 14.6 (H) 11.5 - 14.5 %    Platelets 153 150 - 450 x10*3/uL    Neutrophils % 71.1 40.0 - 80.0 %    Immature Granulocytes %, Automated 0.6 0.0 - 0.9 %    Lymphocytes % 13.7 13.0 - 44.0 %    Monocytes % 11.8 2.0 - 10.0 %    Eosinophils % 2.7 0.0 - 6.0 %    Basophils % 0.1 0.0 - 2.0 %    Neutrophils Absolute 5.99 1.20 - 7.70 x10*3/uL    Immature Granulocytes Absolute, Automated 0.05 0.00 - 0.70 x10*3/uL    Lymphocytes Absolute 1.16 (L) 1.20 - 4.80 x10*3/uL    Monocytes Absolute 1.00 0.10 - 1.00 x10*3/uL    Eosinophils Absolute 0.23 0.00 - 0.70 x10*3/uL    Basophils Absolute 0.01 0.00 - 0.10 x10*3/uL   Basic metabolic panel   Result Value Ref Range    Glucose 109 (H) 74 - 99 mg/dL    Sodium 140 136 - 145 mmol/L     Potassium 4.1 3.5 - 5.3 mmol/L    Chloride 107 98 - 107 mmol/L    Bicarbonate 27 21 - 32 mmol/L    Anion Gap 10 10 - 20 mmol/L    Urea Nitrogen 20 6 - 23 mg/dL    Creatinine 0.97 0.50 - 1.30 mg/dL    eGFR >90 >60 mL/min/1.73m*2    Calcium 8.6 8.6 - 10.3 mg/dL   Protime-INR   Result Value Ref Range    Protime 11.6 9.3 - 12.7 seconds    INR 1.1 0.9 - 1.2           Assessment & Plan  Atrial fibrillation with rapid ventricular response (Multi)    Essential hypertension    Other hyperlipidemia    Rapid atrial fibrillation: Attempted IV diltiazem usage, ineffective causing mild hypotension.  Compliant with anticoagulation for at least the past 30 days.  Attempted 2 dosages of ibutilide, which were ineffective.  Patient has been placed on diltiazem drip.  Will monitor blood pressure carefully.  N.p.o. after midnight.  Plan for electrical cardioversion if the patient does not spontaneously convert by the morning  Hypertensive disorder: Currently mildly hypotensive as a secondary effect of cardiac medications.  Will monitor carefully.  Most recent blood pressure 110/64  Morbid obesity: Has been encouraged to follow Mediterranean diet.  He was placed on Mounjaro approximately 1 year ago.  Is working on weight loss in the outpatient setting.  Diabetes mellitus type 2: Continues on Mounjaro now.  Takes this once a week.  Does not take any other oral or subcu medications for diabetes.  Most recent A1c 5.9 per my review  Hyperlipidemia: Continue on high potency statin  Sleep apnea compliant with CPAP: Patient prefers his own device and his wife will drop off his CPAP machine this evening.     Overall impression:     6/19: As above.  I was involved with this patient's care at the initial stages in the emergency department.  Trialed IV diltiazem x 2 which was effective in reducing rate but also reduce blood pressure.  Patient did not convert back to sinus rhythm.  After CT angio negative for acute findings the patient received oral  potassium as well as IV magnesium followed by 2 dosages of ibutilide which were ineffective in converting the patient back to sinus rhythm.  He is compliant with his  outpatient medications and states compliance with Rythmol as well as metoprolol and reports that he has not missed a single dose of his apixaban twice a day over the past 30 days.  Plan for echocardiogram this hospitalization to reassess LV.  N.p.o. after midnight.  Plan for electrical cardioversion in the morning.  I have consulted electrophysiology for further planning for this patient and to establish care with anticipation of an cardiac ablation in the outpatient setting in the near future.    6/20: No significant changes overnight.  Patient remained on a diltiazem drip.  He does have mild hypotensive state which is common for him in diltiazem however he is asymptomatic this.  I have informed the patient to be cautious and stay in bed and avoid walking at this time.  Since the patient has not spontaneously converted on diltiazem drip he remains n.p.o. and will go for electrical cardioversion today.  He is agreeable with this.  Otherwise chest pain-free.  Euvolemic on examination.  If the patient converts this afternoon and is stable will be discharged to home with plan to follow-up electrophysiology and Dr. Bowser.        I spent 35 minutes in the professional and overall care of this patient.      ELVIRA Thompson-CNP

## 2025-06-20 NOTE — DISCHARGE SUMMARY
Discharge Diagnosis  Atrial fibrillation with rapid ventricular response (Multi)    Issues Requiring Follow-Up  Follow-up with Dr. Bedolla for consideration of outpatient ablation    Test Results Pending At Discharge  Pending Labs       Order Current Status    Extra Tubes Preliminary result    SST TOP Preliminary result            Hospital Course  Presented to hospital with recurrence of atrial fibrillation, rapid  Attempted IV diltiazem utilization and effective  Attempted chemical cardioversion with ibutilide x 2 ineffective  Continued on diltiazem drip overnight, did not spontaneously convert  Successful electrical cardioversion today utilizing 200 J with 1 attempt  Successful recovery without complication  Rythmol increase to 3 times daily.  Continue metoprolol.  Follow-up electrophysiology in the outpatient setting, strongly recommend consideration of cardiac ablation    Visit Vitals  BP 97/59   Pulse 77   Temp 36.8 °C (98.3 °F) (Temporal)   Resp 18     Vitals:    06/19/25 0831   Weight: 147 kg (324 lb)       Immunization History   Administered Date(s) Administered    Moderna COVID-19 vaccine, bivalent, blue cap/gray label *Check age/dose* 11/02/2022    Moderna SARS-CoV-2 Vaccination 01/12/2021, 02/09/2021, 11/10/2021       Results  Results for orders placed or performed during the hospital encounter of 06/19/25 (from the past 24 hours)   Transthoracic Echo Complete   Result Value Ref Range    AV pk sharon 0.99 m/s    LVOT diam 2.36 cm    LV Biplane EF 43 %    LA vol index A/L 26.0 ml/m2    LV EF 68 %    RVSP 21 mmHg    LVIDd 4.35 cm    AV pk grad 4 mmHg    Aortic Valve Area by Continuity of Peak Velocity 3.29 cm2    LV A4C EF 47.2    POCT GLUCOSE   Result Value Ref Range    POCT Glucose 178 (H) 74 - 99 mg/dL   SST TOP   Result Value Ref Range    Extra Tube Hold for add-ons.    VTM Pink Tube   Result Value Ref Range    Extra Tube Hold for add-ons.    Type and Screen   Result Value Ref Range    ABO TYPE B     Rh  TYPE POS     ANTIBODY SCREEN NEG    CBC and Auto Differential   Result Value Ref Range    WBC 8.4 4.4 - 11.3 x10*3/uL    nRBC 0.0 0.0 - 0.0 /100 WBCs    RBC 5.46 4.50 - 5.90 x10*6/uL    Hemoglobin 14.3 13.5 - 17.5 g/dL    Hematocrit 43.8 41.0 - 52.0 %    MCV 80 80 - 100 fL    MCH 26.2 26.0 - 34.0 pg    MCHC 32.6 32.0 - 36.0 g/dL    RDW 14.6 (H) 11.5 - 14.5 %    Platelets 153 150 - 450 x10*3/uL    Neutrophils % 71.1 40.0 - 80.0 %    Immature Granulocytes %, Automated 0.6 0.0 - 0.9 %    Lymphocytes % 13.7 13.0 - 44.0 %    Monocytes % 11.8 2.0 - 10.0 %    Eosinophils % 2.7 0.0 - 6.0 %    Basophils % 0.1 0.0 - 2.0 %    Neutrophils Absolute 5.99 1.20 - 7.70 x10*3/uL    Immature Granulocytes Absolute, Automated 0.05 0.00 - 0.70 x10*3/uL    Lymphocytes Absolute 1.16 (L) 1.20 - 4.80 x10*3/uL    Monocytes Absolute 1.00 0.10 - 1.00 x10*3/uL    Eosinophils Absolute 0.23 0.00 - 0.70 x10*3/uL    Basophils Absolute 0.01 0.00 - 0.10 x10*3/uL   Basic metabolic panel   Result Value Ref Range    Glucose 109 (H) 74 - 99 mg/dL    Sodium 140 136 - 145 mmol/L    Potassium 4.1 3.5 - 5.3 mmol/L    Chloride 107 98 - 107 mmol/L    Bicarbonate 27 21 - 32 mmol/L    Anion Gap 10 10 - 20 mmol/L    Urea Nitrogen 20 6 - 23 mg/dL    Creatinine 0.97 0.50 - 1.30 mg/dL    eGFR >90 >60 mL/min/1.73m*2    Calcium 8.6 8.6 - 10.3 mg/dL   Protime-INR   Result Value Ref Range    Protime 11.6 9.3 - 12.7 seconds    INR 1.1 0.9 - 1.2         Pertinent Physical Exam At Time of Discharge  Physical Exam  See physical assessment from progress note  Home Medications     Medication List      CHANGE how you take these medications     * Ozempic 2 mg/dose (8 mg/3 mL) pen injector; Generic drug: semaglutide;   Inject 2 mg under the skin every 7 (seven) days   * Ozempic 2 mg/dose (8 mg/3 mL) pen injector; Generic drug: semaglutide;   Inject 2 mg under the skin every 7 (seven) days   propafenone 150 mg tablet; Commonly known as: Rythmol; Take 1 tablet   (150 mg) by mouth  every 8 hours.; What changed: when to take this  * This list has 2 medication(s) that are the same as other medications   prescribed for you. Read the directions carefully, and ask your doctor or   other care provider to review them with you.     CONTINUE taking these medications     allopurinol 100 mg tablet; Commonly known as: Zyloprim   amLODIPine 5 mg tablet; Commonly known as: Norvasc; Take 1 tablet (5 mg)   by mouth once daily.   apixaban 5 mg tablet; Commonly known as: Eliquis; Take 1 tablet (5 mg)   by mouth 2 times a day.   atorvastatin 40 mg tablet; Commonly known as: Lipitor   * FreeStyle Zhane 3 Sensor device; Generic drug: blood-glucose sensor;   Change sensor every 14 (fourteen) days   * FreeStyle Zhane 3 Sensor device; Generic drug: blood-glucose sensor;   Use to monitor blood sugar and change every 14 days.   * FreeStyle Zhane 3 Sensor device; Generic drug: blood-glucose sensor;   Use daily and change every 14 days.   * FreeStyle Zhane 3 Sensor device; Generic drug: blood-glucose sensor;   Apply 1 Unit every 14 (fourteen) days   furosemide 20 mg tablet; Commonly known as: Lasix   metoprolol succinate  mg 24 hr tablet; Commonly known as:   Toprol-XL; Take 1 tablet (100 mg) by mouth once daily.   multivitamin with minerals tablet   omeprazole OTC 20 mg EC tablet; Commonly known as: PriLOSEC OTC   tadalafil 10 mg tablet; Commonly known as: Cialis; Take 1 tablet (10 mg)   by mouth once daily as needed for erectile dysfunction (Take 1/2 to 1 pill   at least 30 minutes before sexual activity).   tamsulosin 0.4 mg 24 hr capsule; Commonly known as: Flomax   valsartan-hydrochlorothiazide 320-25 mg tablet; Commonly known as:   Diovan-HCT   Vitamin D3 10 mcg (400 units) tablet; Generic drug: cholecalciferol  * This list has 4 medication(s) that are the same as other medications   prescribed for you. Read the directions carefully, and ask your doctor or   other care provider to review them with you.        Outpatient Follow-Up  Follow-up Dr. Bowser in the next 2 to 3 weeks  Follow-up with Dr. Bedolla in the next 4 weeks    Anthony Johnson, APRN-CNP

## 2025-06-20 NOTE — POST-PROCEDURE NOTE
Physician Transition of Care Summary  Invasive Cardiovascular Lab    Procedure Date: 6/20/2025  Attending:    * Ronal Arnold - Primary  Resident/Fellow/Other Assistant: Surgeons and Role:  * No surgeons found with a matching role *    Indications:   Pre-op Diagnosis      * Atrial fibrillation with rapid ventricular response (Multi) [I48.91]    Post-procedure diagnosis:   Post-op Diagnosis     * Atrial fibrillation with rapid ventricular response (Multi) [I48.91]    Procedure(s):   Cardioversion  60509 - SC CARDIOVERSION ELECTIVE ARRHYTHMIA EXTERNAL        Procedure Findings:   Successful DC cardioversion of atrial fibrillation to sinus rhythm    Description of the Procedure:   Patient was brought to the heart [facility] vascular [place].  Sedation was provided by the anesthesia department.  Once adequate sedation was acquired the patient was given a single 200 J cardioversion shock successfully converting the patient from atrial fibrillation to a sinus rhythm.  The patient appeared to tolerate the procedure well.  He was to be recovered in the heart [facility] [place] and likely discharged home later today.    Complications:   None    Stents/Implants:   Implants       No implant documentation for this case.            Anticoagulation/Antiplatelet Plan:   Eliquis 5 mg twice daily    Estimated Blood Loss:   * No values recorded between 6/20/2025 12:16 PM and 6/20/2025 12:24 PM *    Anesthesia: Monitor Anesthesia Care Anesthesia Staff: Anesthesiologist: Braden Lainez MD  CRNA: ELVIRA Valenzuela-RAYMUNDO    Any Specimen(s) Removed:   No specimens collected during this procedure.    Disposition:   Recover in [facility] vascular [place] and plan on discharging home later today      Electronically signed by: Ronal Arnold DO, 6/20/2025 12:24 PM

## 2025-06-20 NOTE — ANESTHESIA POSTPROCEDURE EVALUATION
"Patient: Chinmay Colao    Procedure Summary       Date: 06/20/25 Room / Location: [facility] CV ROOM / Virtual [facility] Cardiac Cath Lab    Anesthesia Start: 1216 Anesthesia Stop: 1231    Procedure: Cardioversion Diagnosis: Atrial fibrillation with rapid ventricular response (Multi)    Providers: Ronal Arnold DO Responsible Provider: Braden Lainez MD    Anesthesia Type: MAC ASA Status: 3            Anesthesia Type: MAC    .BP 97/59   Pulse 77   Temp 36.8 °C (98.3 °F) (Temporal)   Resp 18   Ht 1.727 m (5' 8\")   Wt 147 kg (324 lb)   SpO2 96%   BMI 49.26 kg/m²        Anesthesia Post Evaluation    Patient location during evaluation: PACU  Patient participation: complete - patient participated  Level of consciousness: awake  Pain management: adequate  Airway patency: patent  Cardiovascular status: acceptable  Respiratory status: acceptable  Hydration status: acceptable  Postoperative Nausea and Vomiting: none        No notable events documented.    "

## 2025-06-20 NOTE — CONSULTS
Inpatient consult to Cardiology  Consult performed by: TYLER Bergeron  Consult ordered by: TYLER Thompson  Reason for consult: AF  RFA        History Of Present Illness:    francine Tee is a 54 y.o. male presenting with onset of palpitations starting at 4 AM with shortness of breath.  Patient has a history of atrial fibrillation dating back to 2019, at that time was treated with diltiazem and converted to sinus rhythm.  No further episodes show 2023 with sudden onset again treated with diltiazem a second episode within that year converting with diltiazem and on the third episode given Rythmol and apixaban not requiring cardioversion.  Since that time he has not any recurrence until today.  Patient has a history of hypertension insulin resistant obstructive sleep apnea treated with CPAP, overweight no tobacco use, no known coronary disease, echocardiogram 2023 preserved left ventricular function noting other structures not well-visualized.  2019 echocardiogram did note LVH.  Patient states he is active without any difficulty.  He is on metoprolol extended release 100 mg daily, Rythmol, combination diuretic ARB, Flomax, furosemide atorvastatin amlodipine allopurinol and ongoing ACE.  Upon admission patient was given ibutilide x 2, diltiazem IV followed by drip with improved rate control slightly soft blood pressures with need to decrease dose.  Initial blood work with normal BUN and creatinine, potassium 3.5 supplemented with 40 mill equivalents troponin 7, #2 8, normal CBC, no TSH/Mag.   Echocardiogram done:   1. The left ventricular systolic function is normal with a visually estimated ejection fraction of 65-70%.   2. There is normal right ventricular global systolic function.   3. The Doppler estimated RVSP is within normal limits at 21 mmHg.   4. Normal left and right atrial size.   5. There is moderately increased septal and mildly increased posterior left ventricular wall  thickness.  Last Recorded Vitals:  Vitals:    06/19/25 2130 06/19/25 2200 06/19/25 2215 06/19/25 2300   BP: 96/74 85/58 83/65 101/71   Pulse: (!) 101 97 (!) 103 (!) 105   Resp: (!) 21 (!) 22 (!) 22 16   Temp:       TempSrc:       SpO2:    97%   Weight:       Height:           Last Labs:  CBC - 6/19/2025:  8:50 AM  8.7 14.8 193    44.9      CMP - 6/19/2025:  8:50 AM  8.8 6.6 35 --- 0.7   _ 3.9 49 98      PTT - No results in last year.  _   _ _     Troponin I, High Sensitivity   Date/Time Value Ref Range Status   06/19/2025 10:06 AM 8 0 - 20 ng/L Final   06/19/2025 08:50 AM 7 0 - 20 ng/L Final     BNP   Date/Time Value Ref Range Status   06/19/2025 08:50 AM 61 0 - 99 pg/mL Final     LDL Calculated   Date/Time Value Ref Range Status   12/16/2019 03:17 AM 89 65 - 130 MG/DL Final      Last I/O:  I/O last 3 completed shifts:  In: 160 (1.1 mL/kg) [I.V.:100 (0.7 mL/kg); IV Piggyback:60]  Out: - (0 mL/kg)   Weight: 147 kg     Past Cardiology Tests (Last 3 Years):  EKG:  ECG 12 lead 06/19/2025 (Preliminary)      ECG 12 lead (Clinic Performed) 11/19/2024    Echo:  Transthoracic Echo Complete 06/19/2025    Ejection Fraction  Cath:  No results found for this or any previous visit from the past 1095 days.    Stress Test:  No results found for this or any previous visit from the past 1095 days.    Cardiac Imaging:  No results found for this or any previous visit from the past 1095 days.      Past Medical History:  He has a past medical history of Atrial fibrillation (Multi), Diabetes mellitus (Multi), Hyperlipidemia, Hypertension, and Sleep apnea.    He has no past medical history of Heart murmur or Myocardial infarction (Multi).    Past Surgical History:  He has no past surgical history on file.      Social History:  He reports that he has never smoked. He has never used smokeless tobacco. He reports that he does not currently use alcohol after a past usage of about 6.0 standard drinks of alcohol per week. He reports that he does  not use drugs.    Family History:  Family History[1]     Allergies:  Patient has no known allergies.    Inpatient Medications:  Scheduled Medications[2]  PRN Medications[3]  Continuous Medications[4]  Outpatient Medications:  Current Outpatient Medications   Medication Instructions    allopurinol (ZYLOPRIM) 100 mg, Daily RT    amLODIPine (NORVASC) 5 mg, oral, Daily    apixaban (ELIQUIS) 5 mg, oral, 2 times daily    atorvastatin (LIPITOR) 40 mg, Daily    cholecalciferol (VITAMIN D3) 10 mcg, Daily    FreeStyle Zhane 3 Sensor device Change sensor every 14 (fourteen) days    FreeStyle Zhane 3 Sensor device Use to monitor blood sugar and change every 14 days.    FreeStyle Zhane 3 Sensor device Use daily and change every 14 days.    FreeStyle Zhane 3 Sensor device Apply 1 Unit every 14 (fourteen) days    furosemide (LASIX) 20 mg, Daily    metoprolol succinate XL (TOPROL-XL) 100 mg, oral, Daily    multivitamin with minerals tablet 1 tablet, Daily    omeprazole OTC (PRILOSEC OTC) 20 mg, Daily before breakfast    propafenone (RYTHMOL) 150 mg, oral, Daily    semaglutide (Ozempic) 2 mg/dose (8 mg/3 mL) pen injector Inject 2 mg under the skin every 7 (seven) days    semaglutide (Ozempic) 2 mg/dose (8 mg/3 mL) pen injector Inject 2 mg under the skin every 7 (seven) days    tadalafil (CIALIS) 10 mg, oral, Daily PRN    tamsulosin (FLOMAX) 0.4 mg, Daily    valsartan-hydrochlorothiazide (Diovan-HCT) 320-25 mg tablet 1 tablet, Daily       Physical Exam:  Gen: A+O x 3, no acute distress, healthy appearing male  HEENT: Normocephalic/atraumatic pupils equal react light  Neck: No JVD, upstrokes and volumes nl, no bruits   Lung: CTA, nl AP diameter  CV:  nl sounding S1, S2, no murmur, PMI nondisplaced  Abdomen: Large, soft, non tender, + BS x 4   Extremities: warm to touch, palpable pulses bilaterally, no edema   Neuro: no neurologic deficits     Assessment/Plan   54-year-old male against a background of hypertension insulin resistance  suspect genetic component developed first episode of AF age 48 recurrence in 2023 and has remained in sinus rhythm since.  Today presents with onset of palpitations at 4 AM refractory to IV calcium channel blocker,  IV ibutilide x2 , though rates improved.  Echocardiogram with normal LV function, LA size 4.27 cm squared, evidence for LVH   Would continue with diltiazem gtt decrease to 5 mg to accommodate low BP, continue with BB and ACE, plans for cardioversion in a.m.  As an outpatient would schedule for pulsed field ablation,  will discuss antiarrhythmic drug with Dr. Bedolla in light of LVH  Thank you for the consult on this pleasant gentleman      Peripheral IV 06/19/25 20 G Right Antecubital (Active)   Site Assessment Clean;Dry;Intact 06/19/25 0856   Dressing Type Transparent 06/19/25 0856   Line Status Flushed 06/19/25 0856   Dressing Status Clean;Dry;Occlusive 06/19/25 0856   Number of days: 0       Code Status:  Full Code    I spent 45 minutes in the professional and overall care of this patient.        Kenyetta Edmondson, APRN-CNP       [1] No family history on file.  [2]   Scheduled medications   Medication Dose Route Frequency    [START ON 6/20/2025] allopurinol  100 mg oral Daily    [START ON 6/20/2025] amLODIPine  5 mg oral Daily    apixaban  5 mg oral BID    [START ON 6/20/2025] atorvastatin  40 mg oral Daily    [START ON 6/20/2025] furosemide  20 mg oral Daily    [START ON 6/20/2025] hydroCHLOROthiazide  25 mg oral Daily    [START ON 6/20/2025] metoprolol succinate XL  100 mg oral Daily    perflutren lipid microspheres  0.5-10 mL of dilution intravenous Once in imaging    [START ON 6/20/2025] propafenone  150 mg oral Daily    [START ON 6/20/2025] tamsulosin  0.4 mg oral Daily    [START ON 6/20/2025] valsartan  320 mg oral Daily   [3]   PRN medications   Medication   [4]   Continuous Medications   Medication Dose Last Rate    dilTIAZem  5-15 mg/hr 5 mg/hr (06/19/25 2042)

## 2025-06-20 NOTE — ANESTHESIA PREPROCEDURE EVALUATION
Patient: Chinmay Tee    Procedure Information       Date/Time: 06/20/25 1200    Procedure: Cardioversion    Location: [facility] CV ROOM / Virtual [facility] Cardiac Cath Lab    Providers: Ronal Arnold DO          Medical History[1]   Relevant Problems   Cardiac   (+) Atrial fibrillation with rapid ventricular response (Multi)   (+) Essential hypertension   (+) Other hyperlipidemia   (+) Paroxysmal atrial fibrillation (Multi)      Pulmonary   (+) SHONA (obstructive sleep apnea)      Endocrine   (+) Obesity   (+) Type 2 diabetes mellitus, with long-term current use of insulin     Surgical History[2]   Clinical information reviewed:   Tobacco  Allergies  Meds  Problems  Med Hx  Surg Hx   Fam Hx  Soc   Hx        NPO Detail:  No data recorded     Physical Exam    Airway  Mallampati: III  TM distance: >3 FB  Neck ROM: full     Cardiovascular    Dental    Pulmonary    Abdominal            Anesthesia Plan    History of general anesthesia?: no  History of complications of general anesthesia?: unknown/emergency    ASA 3     MAC     The patient is not a current smoker.  Patient was not previously instructed to abstain from smoking on day of procedure.  Patient did not smoke on day of procedure.    intravenous induction   Anesthetic plan and risks discussed with patient.    Plan discussed with attending.           [1]   Past Medical History:  Diagnosis Date    Atrial fibrillation (Multi)     Diabetes mellitus (Multi)     Hyperlipidemia     Hypertension     Sleep apnea    [2] History reviewed. No pertinent surgical history.

## 2025-06-20 NOTE — DISCHARGE INSTRUCTIONS
*****Please do not miss any doses of your anticoagulant (Eliquis/Xarelto/Warfarin). This is very important to prevent the risk of stroke.  ******               Cardioversion           Cardioversion is a non-surgical way to restore your heart's normal rhythm. Medication may be tried first to correct an irregular heartbeat, but if medicines do not work, electrical cardioversion may be needed. The electrical current should make your heartbeat normally again.      After the procedure-    Your heart rate, blood pressure and respirations will be checked frequently by the nurse until you are fully alert.      When the patches are removed form your chest, you may notice redness, irritation, or itching similar to a mild sunburn. You may You may use over the counter hydrocortisone 1% cream and apply up to three times a day for any irritation to your skin on your chest.      Discharge information-   Have an adult with you to get you home from the hospital; you will not be allowed to drive for 24 hours after the procedure.      You may resume your usual routine after discharge.      Make sure you and your family understands how you are to take your medications. The doctor will tell you what to do with your cardiac medicines and your anti-coagulation medicines.      There is a small chance your irregular heartbeat may return. If you feel skipped beats, rapid heart rate, or chest tightness, call your doctor.     You have received sedation today, please follow these guidelines   FOR NEXT 24 HOURS  - Upon discharge, you should return home and rest for the remainder of the day and evening. You do not have to stay on bed rest but should not be very active.  It is recommended a responsible adult be with you for the first 24 hours after the procedure.    -Please resume your blood thinning medication the evening of your procedure.      - No driving for 24 hours after procedure.     - Do not drive, operate machinery, or use power tools  for 24 hours after your procedure.     - Do not make any legal decisions for 24 hours after your procedure.     - Do not drink alcoholic beverages for 24 hours after your procedure.

## 2025-06-20 NOTE — PROGRESS NOTES
06/20/25 0852   Discharge Planning   Living Arrangements Spouse/significant other   Support Systems Spouse/significant other   Assistance Needed Patient reports he is independent of all ADLs and IADLs.   Type of Residence Private residence   Who is requesting discharge planning? Provider   Home or Post Acute Services None   Expected Discharge Disposition Home   Does the patient need discharge transport arranged? No   Financial Resource Strain   How hard is it for you to pay for the very basics like food, housing, medical care, and heating? Not hard   Housing Stability   In the last 12 months, was there a time when you were not able to pay the mortgage or rent on time? N   In the past 12 months, how many times have you moved where you were living? 0   At any time in the past 12 months, were you homeless or living in a shelter (including now)? N   Transportation Needs   In the past 12 months, has lack of transportation kept you from medical appointments or from getting medications? no   In the past 12 months, has lack of transportation kept you from meetings, work, or from getting things needed for daily living? No   Stroke Family Assessment   Stroke Family Assessment Needed No   Intensity of Service   Intensity of Service 0-30 min     MSW spoke with patient via telephone. Patient reports he lives at home with his spouse and is independent of ADLs and IADLs. Patient reports he still works. Patient denies any home going needs or concerns at this time. Patient states plan is for cardioversion this date.     Patient plans to return home upon discharge.

## 2025-06-20 NOTE — PROGRESS NOTES
Patient seen today by Dr. Bedolla in the ED. Patient is to be scheduled for an AF PFA as an outpatient.

## 2025-06-23 ENCOUNTER — DOCUMENTATION (OUTPATIENT)
Facility: CLINIC | Age: 55
End: 2025-06-23
Payer: MEDICARE

## 2025-06-23 LAB
Q ONSET: 221 MS
QRS COUNT: 22 BEATS
QRS DURATION: 88 MS
QT INTERVAL: 310 MS
QTC CALCULATION(BAZETT): 465 MS
QTC FREDERICIA: 406 MS
R AXIS: 66 DEGREES
T AXIS: 35 DEGREES
T OFFSET: 376 MS
VENTRICULAR RATE: 135 BPM

## 2025-06-23 NOTE — PROGRESS NOTES
The patient did undergo elective cardioversion on June 20.  Dictated brief procedure report.  Successful cardioversion of the atrial fibrillation back to a sinus rhythm.  This occurred on June 20, 2025.

## 2025-07-02 PROCEDURE — RXMED WILLOW AMBULATORY MEDICATION CHARGE

## 2025-07-03 ENCOUNTER — PHARMACY VISIT (OUTPATIENT)
Dept: PHARMACY | Facility: CLINIC | Age: 55
End: 2025-07-03
Payer: COMMERCIAL

## 2025-07-07 ENCOUNTER — PHARMACY VISIT (OUTPATIENT)
Dept: PHARMACY | Facility: CLINIC | Age: 55
End: 2025-07-07

## 2025-07-11 LAB
ATRIAL RATE: 75 BPM
P AXIS: 33 DEGREES
P OFFSET: 184 MS
P ONSET: 138 MS
PR INTERVAL: 152 MS
Q ONSET: 214 MS
QRS COUNT: 12 BEATS
QRS DURATION: 88 MS
QT INTERVAL: 370 MS
QTC CALCULATION(BAZETT): 413 MS
QTC FREDERICIA: 398 MS
R AXIS: 76 DEGREES
T AXIS: 30 DEGREES
T OFFSET: 399 MS
VENTRICULAR RATE: 75 BPM

## 2025-07-16 ENCOUNTER — PRE-ADMISSION TESTING (OUTPATIENT)
Dept: PREADMISSION TESTING | Facility: HOSPITAL | Age: 55
End: 2025-07-16
Payer: MEDICARE

## 2025-07-16 ENCOUNTER — APPOINTMENT (OUTPATIENT)
Dept: LAB | Facility: HOSPITAL | Age: 55
End: 2025-07-16
Payer: MEDICARE

## 2025-07-16 VITALS
OXYGEN SATURATION: 99 % | RESPIRATION RATE: 18 BRPM | DIASTOLIC BLOOD PRESSURE: 84 MMHG | SYSTOLIC BLOOD PRESSURE: 113 MMHG | TEMPERATURE: 97.2 F | HEIGHT: 68 IN | WEIGHT: 315 LBS | HEART RATE: 87 BPM | BODY MASS INDEX: 47.74 KG/M2

## 2025-07-16 DIAGNOSIS — I48.91 UNSPECIFIED ATRIAL FIBRILLATION (MULTI): Primary | ICD-10-CM

## 2025-07-16 LAB
ABO GROUP (TYPE) IN BLOOD: NORMAL
ANTIBODY SCREEN: NORMAL
HOLD SPECIMEN: NORMAL
RH FACTOR (ANTIGEN D): NORMAL

## 2025-07-16 PROCEDURE — 86900 BLOOD TYPING SEROLOGIC ABO: CPT

## 2025-07-16 PROCEDURE — 86850 RBC ANTIBODY SCREEN: CPT

## 2025-07-16 PROCEDURE — 86901 BLOOD TYPING SEROLOGIC RH(D): CPT

## 2025-07-16 PROCEDURE — 99204 OFFICE O/P NEW MOD 45 MIN: CPT | Performed by: NURSE PRACTITIONER

## 2025-07-16 ASSESSMENT — DUKE ACTIVITY SCORE INDEX (DASI)
CAN YOU WALK A BLOCK OR TWO ON LEVEL GROUND: YES
CAN YOU TAKE CARE OF YOURSELF (EAT, DRESS, BATHE, OR USE TOILET): YES
CAN YOU DO YARD WORK LIKE RAKING LEAVES, WEEDING OR PUSHING A MOWER: YES
DASI METS SCORE: 9.9
CAN YOU WALK INDOORS, SUCH AS AROUND YOUR HOUSE: YES
CAN YOU PARTICIPATE IN STRENOUS SPORTS LIKE SWIMMING, SINGLES TENNIS, FOOTBALL, BASKETBALL, OR SKIING: YES
CAN YOU DO LIGHT WORK AROUND THE HOUSE LIKE DUSTING OR WASHING DISHES: YES
CAN YOU CLIMB A FLIGHT OF STAIRS OR WALK UP A HILL: YES
CAN YOU DO MODERATE WORK AROUND THE HOUSE LIKE VACUUMING, SWEEPING FLOORS OR CARRYING GROCERIES: YES
CAN YOU DO HEAVY WORK AROUND THE HOUSE LIKE SCRUBBING FLOORS OR LIFTING AND MOVING HEAVY FURNITURE: YES
CAN YOU PARTICIPATE IN MODERATE RECREATIONAL ACTIVITIES LIKE GOLF, BOWLING, DANCING, DOUBLES TENNIS OR THROWING A BASEBALL OR FOOTBALL: YES
CAN YOU HAVE SEXUAL RELATIONS: YES
TOTAL_SCORE: 58.2
CAN YOU RUN A SHORT DISTANCE: YES

## 2025-07-16 ASSESSMENT — PAIN - FUNCTIONAL ASSESSMENT: PAIN_FUNCTIONAL_ASSESSMENT: 0-10

## 2025-07-16 ASSESSMENT — ENCOUNTER SYMPTOMS
PSYCHIATRIC NEGATIVE: 1
SHORTNESS OF BREATH: 1
PALPITATIONS: 1
CONSTITUTIONAL NEGATIVE: 1
HEMATOLOGIC/LYMPHATIC NEGATIVE: 1
GASTROINTESTINAL NEGATIVE: 1
MUSCULOSKELETAL NEGATIVE: 1
EYES NEGATIVE: 1
NEUROLOGICAL NEGATIVE: 1

## 2025-07-16 ASSESSMENT — PAIN SCALES - GENERAL: PAINLEVEL_OUTOF10: 0 - NO PAIN

## 2025-07-16 NOTE — PREPROCEDURE INSTRUCTIONS
Medication List            Accurate as of July 16, 2025  3:27 PM. Always use your most recent med list.                allopurinol 100 mg tablet  Commonly known as: Zyloprim  Take 1 tablet (100 mg) by mouth Daily  Medication Adjustments for Surgery: Take on the morning of surgery     amLODIPine 5 mg tablet  Commonly known as: Norvasc  Take 1 tablet (5 mg) by mouth once daily.  Medication Adjustments for Surgery: Take on the morning of surgery     apixaban 5 mg tablet  Commonly known as: Eliquis  Take 1 tablet (5 mg) by mouth 2 times a day.  Medication Adjustments for Surgery: Take last dose 1 day (24 hours) before surgery  Notes to patient: Take last dose 7/22 in the morning then hold until after surgery     atorvastatin 40 mg tablet  Commonly known as: Lipitor  Medication Adjustments for Surgery: Take on the morning of surgery                    furosemide 20 mg tablet  Commonly known as: Lasix  Take 1 tablet (20 mg) by mouth Daily  Medication Adjustments for Surgery: Do Not take on the morning of surgery        * metoprolol succinate  mg 24 hr tablet  Commonly known as: Toprol-XL  Take 1 tablet (100 mg) by mouth once daily at the same time  Medication Adjustments for Surgery: Take on the morning of surgery     multivitamin with minerals tablet  Additional Medication Adjustments for Surgery: Take last dose 7 days before surgery     omeprazole OTC 20 mg EC tablet  Commonly known as: PriLOSEC OTC  Medication Adjustments for Surgery: Take on the morning of surgery     * Ozempic 2 mg/dose (8 mg/3 mL) pen injector  Generic drug: semaglutide  Inject 2 mg under the skin every 7 (seven) days  Additional Medication Adjustments for Surgery: Take last dose 7 days before surgery           PROBIOTIC ORAL  Additional Medication Adjustments for Surgery: Take last dose 7 days before surgery     propafenone 150 mg tablet  Commonly known as: Rythmol  Take 1 tablet (150 mg) by mouth every 8 hours.  Medication Adjustments for  Surgery: Take on the morning of surgery     tadalafil 10 mg tablet  Commonly known as: Cialis  Take 1 tablet (10 mg) by mouth once daily as needed for erectile dysfunction (Take 1/2 to 1 pill at least 30 minutes before sexual activity).  Medication Adjustments for Surgery: Take last dose 3 days before surgery     tamsulosin 0.4 mg 24 hr capsule  Commonly known as: Flomax  Medication Adjustments for Surgery: Take on the morning of surgery     valsartan-hydrochlorothiazide 320-25 mg tablet  Commonly known as: Diovan-HCT  Take 1 tablet by mouth Daily  Medication Adjustments for Surgery: Do Not take on the morning of surgery     Vitamin D3 10 mcg (400 units) tablet  Generic drug: cholecalciferol  Additional Medication Adjustments for Surgery: Take last dose 7 days before surgery           * This list has 10 medication(s) that are the same as other medications prescribed for you. Read the directions carefully, and ask your doctor or other care provider to review them with you.                     OKAY TO CONTINUE TAKING ASPIRIN AND/OR PLAVIX.    PLEASE MAKE SURE TO BRING AN UPDATED LIST OF YOUR MEDICATIONS TO YOUR PROCEDURE.    PLEASE MAKE SURE TO HAVE A RIDE HOME AFTER YOUR PROCEDURE.    [facility] WILL CONTACT YOU THE DAY BEFORE YOUR PROCEDURE REGARDING WHEN TO ARRIVE FOR YOUR PROCEDURE.    PLEASE CONTACT YOUR CARDIOLOGIST'S OFFICE ABOUT HOLDING ANY ANTIARRHYTHMIC MEDICATION.                 DR. BUTCHER OR  DR. VILLATORO   [phone]                  THE DAY OF YOUR PROCEDURE PLEASE DO NOT EAT OR DRINK ANYTHING AFTER MIDNIGHT.      OKAY TO TAKE MEDICATIONS WITH A SMALL SIP OF WATER.    IF YOU'RE A DIABETIC PLEASE MAKE SURE TO HOLD ALL DIABETIC MEDICATION THE DAY OF YOUR PROCEDURE.

## 2025-07-16 NOTE — CPM/PAT H&P
CPM/PAT Evaluation       Name: Chinmay Tee (Todd Colao)  /Age: 1970/54 y.o.     In-Person       Chief Complaint: Atrial  fibrillation     HPI    Pt is a 54 year old male with atrial fibrillation. Pt was hospitalized on at the end of  with Afib with rapid ventricular response. Pt stated a  cardioversion was completed. Pt stated when he is in Afib he feels SOB and feels heart palpitations. Pt is taking Eliquis, Rythmol, and metoprolol succinate XL. Pt was scheduled for Afib ablation. Pt denies  CP or dizziness.     Past Medical History:   Diagnosis Date    Atrial fibrillation (Multi)     GERD (gastroesophageal reflux disease)     Gout     Hyperlipidemia     Hypertension     Prediabetes     Sleep apnea     Squamous cell carcinoma, lip        Past Surgical History:   Procedure Laterality Date    CARDIAC ELECTROPHYSIOLOGY PROCEDURE N/A 2025    Procedure: Cardioversion;  Surgeon: Ronal Arnold DO;  Location: [facility];  Service: Cardiovascular;  Laterality: N/A;    COLONOSCOPY      LUMBAR SPINE SURGERY      OTHER SURGICAL HISTORY      wedge excision of lower lip skin lesion    OTHER SURGICAL HISTORY Left     neck disection for metastatic cancer    TYMPANOSTOMY TUBE PLACEMENT      VASECTOMY       Social History     Tobacco Use    Smoking status: Never    Smokeless tobacco: Never   Substance Use Topics    Alcohol use: Yes     Alcohol/week: 1.0 standard drink of alcohol     Types: 1 Cans of beer per week     Comment: minimal     Social History     Substance and Sexual Activity   Drug Use Never       Patient  reports being sexually active and has had partner(s) who are female. He reports using the following method of birth control/protection: Male Sterilization.    Family History[1]    No Known Allergies    Current Outpatient Medications   Medication Sig Dispense Refill    allopurinol (Zyloprim) 100 mg tablet Take 1 tablet (100 mg) by mouth Daily 90 tablet 1    amLODIPine (Norvasc) 5 mg tablet Take  1 tablet (5 mg) by mouth once daily. 90 tablet 3    apixaban (Eliquis) 5 mg tablet Take 1 tablet (5 mg) by mouth 2 times a day. 180 tablet 3    atorvastatin (Lipitor) 40 mg tablet Take 1 tablet (40 mg) by mouth once daily.      cholecalciferol (Vitamin D3) 10 mcg (400 units) tablet Take 1 tablet (10 mcg) by mouth once daily.      furosemide (Lasix) 20 mg tablet Take 1 tablet (20 mg) by mouth Daily 90 tablet 1    Lactobacillus acidophilus (PROBIOTIC ORAL) Take by mouth.      metoprolol succinate XL (Toprol-XL) 100 mg 24 hr tablet Take 1 tablet (100 mg) by mouth once daily. 90 tablet 3    multivitamin with minerals tablet Take 1 tablet by mouth once daily.      omeprazole OTC (PriLOSEC OTC) 20 mg EC tablet Take 1 tablet (20 mg) by mouth once daily in the morning. Take before meals. Do not crush, chew, or split.      propafenone (Rythmol) 150 mg tablet Take 1 tablet (150 mg) by mouth every 8 hours. 270 tablet 3    tadalafil (Cialis) 10 mg tablet Take 1 tablet (10 mg) by mouth once daily as needed for erectile dysfunction (Take 1/2 to 1 pill at least 30 minutes before sexual activity). 30 tablet 11    tamsulosin (Flomax) 0.4 mg 24 hr capsule Take 1 capsule (0.4 mg) by mouth once daily.      valsartan-hydrochlorothiazide (Diovan-HCT) 320-25 mg tablet Take 1 tablet by mouth Daily 90 tablet 1    blood-glucose sensor (FreeStyle Zhane 3 Plus Sensor) device Use 1 sensore change Every 15 Days 6 each 2    FreeStyle Zhane 3 Sensor device Change sensor every 14 (fourteen) days 6 each 3    FreeStyle Zhane 3 Sensor device Use to monitor blood sugar and change every 14 days. 6 each 3    FreeStyle Zhane 3 Sensor device Use daily and change every 14 days. 6 each 3    FreeStyle Zhane 3 Sensor device Apply 1 Unit every 14 (fourteen) days 6 each 3    metoprolol succinate XL (Toprol-XL) 100 mg 24 hr tablet Take 1 tablet (100 mg) by mouth once daily at the same time (Patient not taking: Reported on 7/16/2025) 90 tablet 1    semaglutide  (Ozempic) 2 mg/dose (8 mg/3 mL) pen injector Inject 2 mg under the skin every 7 (seven) days (Patient not taking: Reported on 7/16/2025) 6 mL 1    semaglutide (Ozempic) 2 mg/dose (8 mg/3 mL) pen injector Inject 2 mg under the skin every 7 (seven) days (Patient not taking: Reported on 7/16/2025) 6 mL 1    semaglutide (Ozempic) 2 mg/dose (8 mg/3 mL) pen injector Inject 2 mg under the skin every 7 days. (Patient taking differently: Inject 2 mg under the skin every 7 days. Sunday) 3 mL 2     No current facility-administered medications for this visit.     Review of Systems   Constitutional: Negative.    HENT: Negative.     Eyes: Negative.    Respiratory:  Positive for shortness of breath (SOB with Afib).    Cardiovascular:  Positive for palpitations.   Gastrointestinal: Negative.    Genitourinary: Negative.    Musculoskeletal: Negative.    Skin: Negative.    Neurological: Negative.    Hematological: Negative.    Psychiatric/Behavioral: Negative.       Physical Exam  Vitals reviewed.   Constitutional:       Appearance: He is morbidly obese.   HENT:      Head: Normocephalic and atraumatic.      Nose: Nose normal.      Mouth/Throat:      Mouth: Mucous membranes are moist.      Pharynx: Oropharynx is clear.     Eyes:      Extraocular Movements: Extraocular movements intact.      Conjunctiva/sclera: Conjunctivae normal.      Pupils: Pupils are equal, round, and reactive to light.       Cardiovascular:      Rate and Rhythm: Normal rate. Rhythm irregular.      Pulses: Normal pulses.      Heart sounds: Normal heart sounds.   Pulmonary:      Effort: Pulmonary effort is normal. No respiratory distress.      Breath sounds: Normal breath sounds. No wheezing, rhonchi or rales.      Comments: No conversational dyspnea  Abdominal:      Palpations: Abdomen is soft.     Musculoskeletal:         General: Normal range of motion.      Cervical back: Normal range of motion and neck supple.     Skin:     General: Skin is warm and dry.       "Comments: Chronic skin discoloration to BLE     Neurological:      General: No focal deficit present.      Mental Status: He is alert and oriented to person, place, and time. Mental status is at baseline.     Psychiatric:         Mood and Affect: Mood normal.         Behavior: Behavior normal.         Thought Content: Thought content normal.         Judgment: Judgment normal.          PAT AIRWAY:   Airway:     Mallampati::  III    TM distance::  >3 FB    Neck ROM::  Full  normal      Visit Vitals  /84   Pulse 87   Temp 36.2 °C (97.2 °F) (Temporal)   Resp 18   Ht 1.727 m (5' 8\")   Wt 145 kg (320 lb 5.3 oz)   SpO2 99%   BMI 48.71 kg/m²   Smoking Status Never   BSA 2.64 m²     ASA: 3   CHADS:  2.8%  RCRI: 0.4%  Ariscat: 1.6%  DASI Risk Score      Flowsheet Row Pre-Admission Testing from 7/16/2025 in [facility]   Can you take care of yourself (eat, dress, bathe, or use toilet)?  2.75 filed at 07/16/2025 1522   Can you walk indoors, such as around your house? 1.75 filed at 07/16/2025 1522   Can you walk a block or two on level ground?  2.75 filed at 07/16/2025 1522   Can you climb a flight of stairs or walk up a hill? 5.5 filed at 07/16/2025 1522   Can you run a short distance? 8 filed at 07/16/2025 1522   Can you do light work around the house like dusting or washing dishes? 2.7 filed at 07/16/2025 1522   Can you do moderate work around the house like vacuuming, sweeping floors or carrying groceries? 3.5 filed at 07/16/2025 1522   Can you do heavy work around the house like scrubbing floors or lifting and moving heavy furniture?  8 filed at 07/16/2025 1522   Can you do yard work like raking leaves, weeding or pushing a mower? 4.5 filed at 07/16/2025 1522   Can you have sexual relations? 5.25 filed at 07/16/2025 1522   Can you participate in moderate recreational activities like golf, bowling, dancing, doubles tennis or throwing a baseball or football? 6 filed at 07/16/2025 1522   Can you " participate in strenous sports like swimming, singles tennis, football, basketball, or skiing? 7.5 filed at 07/16/2025 1522   DASI SCORE 58.2 filed at 07/16/2025 1522   METS Score (Will be calculated only when all the questions are answered) 9.9 filed at 07/16/2025 1522     Caprini DVT Assessment      Flowsheet Row ED to Hosp-Admission (Discharged) from 6/19/2025 in [facility] with Ronal Arnold DO and Michelle Munguia MD   DVT Score (IF A SCORE IS NOT CALCULATING, MUST SELECT A BMI TO COMPLETE) 4 filed at 06/20/2025 1223   BMI (BMI MUST BE CHOSEN) 41-50 (Morbid obesity) filed at 06/20/2025 1223     Modified Frailty Index    No data to display  [id number] Stroke Risk Points  Current as of just now        2 0 to 9 Points:      No Change          The [id number] risk score (Lip GH, et al. 2009. © 2010 [employer]) quantifies the risk of stroke for a patient with atrial fibrillation. For patients without atrial fibrillation or under the age of 18 this score appears as N/A. Higher score values generally indicate higher risk of stroke.          Points Metrics   0 Has Congestive Heart Failure:  No     Patients with congestive heart failure get 1 point.    Current as of just now   1 Has Hypertension:  Yes     Patients with hypertension get 1 point.    Current as of just now   0 Age:  54     Patients 65 to 74 years old get 1 point, or patients 75 years and older get 2 points.    Current as of just now   1 Has Diabetes:  Yes     Patients with diabetes get 1 point.    Current as of just now   0 Had Stroke:  No  Had TIA:  No  Had Thromboembolism:  No     Patients who have had a stroke, TIA, or thromboembolism get 2 points.    Current as of just now   0 Has Vascular Disease:  No     Patients with vascular disease get 1 point.    Current as of just now   0 Clinically Relevant Sex:  Male     Patients with a clinically relevant sex of Female get 1 point.    Current as of just now              Revised Cardiac Risk Index      Flowsheet Row Pre-Admission Testing from 7/16/2025 in [facility]   High-Risk Surgery (Intraperitoneal, Intrathoracic,Suprainguinal vascular) 0 filed at 07/16/2025 1608   History of ischemic heart disease (History of MI, History of positive exercuse test, Current chest paint considered due to myocardial ischemia, Use of nitrate therapy, ECG with pathological Q Waves) 0 filed at 07/16/2025 1608   History of congestive heart failure (pulmonary edemia, bilateral rales or S3 gallop, Paroxysmal nocturnal dyspnea, CXR showing pulmonary vascular redistribution) 0 filed at 07/16/2025 1608   History of cerebrovascular disease (Prior TIA or stroke) 0 filed at 07/16/2025 1608   Pre-operative insulin treatment 0 filed at 07/16/2025 1608   Pre-operative creatinine>2 mg/dl 0 filed at 07/16/2025 1608   Revised Cardiac Risk Calculator 0 filed at 07/16/2025 1608     Apfel Simplified Score    No data to display  Risk Analysis Index Results This Encounter    No data found in the last 10 encounters.       Stop Bang Score      Flowsheet Row Pre-Admission Testing from 7/16/2025 in [facility]   Do you snore loudly? 1 filed at 07/16/2025 1522   Do you often feel tired or fatigued after your sleep? 0 filed at 07/16/2025 1522   Has anyone ever observed you stop breathing in your sleep? 1 filed at 07/16/2025 1522   Do you have or are you being treated for high blood pressure? 1 filed at 07/16/2025 1522   Recent BMI (Calculated) 48.7 filed at 07/16/2025 1522   Is BMI greater than 35 kg/m2? 1=Yes filed at 07/16/2025 1522   Age older than 50 years old? 1=Yes filed at 07/16/2025 1522   Is your neck circumference greater than 17 inches (Male) or 16 inches (Female)? 1 filed at 07/16/2025 1522   Gender - Male 1=Yes filed at 07/16/2025 1522   STOP-BANG Total Score 7 filed at 07/16/2025 1522     Prodigy: High Risk  Total Score: 8              Prodigy Gender Score          ARISCAT  Score for Postoperative Pulmonary Complications    No data to display  Lockett Perioperative Risk for Myocardial Infarction or Cardiac Arrest (BRANNON)    No data to display      Assessment and Plan:     Atrial fibrillation with rapid ventricular response: Ablation A-Fib PFA   HTN: Pt is taking amlodipine, metoprolol succinate  XL, and furosemide.    SHONA: compliant with CPAP use .   Prediabetic: 7/2/2025  HgbA1C: 5.6  GERD: pt is taking omeprazole.   GOUT: Pt is taking allopurinol.   Venous insufficiency: Pt is taking furosemide.   BMI: 48.71; Pt was taking Ozempic for weight loss.   History of Squamous cell carcinoma lip cancers  s/p lip lesion excision and neck disection for metastatic cancer. Pt was also treated with radiation.     CBC, BMP, and T&S collected in PAT.  EKG completed on 6/20/2025 6/19/2025 ECHO:  CONCLUSIONS:   1. The left ventricular systolic function is normal with a visually estimated ejection fraction of 65-70%.   2. There is normal right ventricular global systolic function.   3. The Doppler estimated RVSP is within normal limits at 21 mmHg.   4. Normal left and right atrial size.   5. There is moderately increased septal and mildly increased posterior left ventricular wall thickness.    Ananya Burrell, ELVIRA-CNP           [1] No family history on file.

## 2025-07-16 NOTE — H&P (VIEW-ONLY)
CPM/PAT Evaluation       Name: Chinmay Tee (Todd Colao)  /Age: 1970/54 y.o.     In-Person       Chief Complaint: Atrial  fibrillation     HPI    Pt is a 54 year old male with atrial fibrillation. Pt was hospitalized on at the end of  with Afib with rapid ventricular response. Pt stated a  cardioversion was completed. Pt stated when he is in Afib he feels SOB and feels heart palpitations. Pt is taking Eliquis, Rythmol, and metoprolol succinate XL. Pt was scheduled for Afib ablation. Pt denies  CP or dizziness.     Past Medical History:   Diagnosis Date    Atrial fibrillation (Multi)     GERD (gastroesophageal reflux disease)     Gout     Hyperlipidemia     Hypertension     Prediabetes     Sleep apnea     Squamous cell carcinoma, lip        Past Surgical History:   Procedure Laterality Date    CARDIAC ELECTROPHYSIOLOGY PROCEDURE N/A 2025    Procedure: Cardioversion;  Surgeon: Ronal Arnold DO;  Location: [facility] Cardiac Cath Lab;  Service: Cardiovascular;  Laterality: N/A;    COLONOSCOPY      LUMBAR SPINE SURGERY      OTHER SURGICAL HISTORY      wedge excision of lower lip skin lesion    OTHER SURGICAL HISTORY Left     neck disection for metastatic cancer    TYMPANOSTOMY TUBE PLACEMENT      VASECTOMY       Social History     Tobacco Use    Smoking status: Never    Smokeless tobacco: Never   Substance Use Topics    Alcohol use: Yes     Alcohol/week: 1.0 standard drink of alcohol     Types: 1 Cans of beer per week     Comment: minimal     Social History     Substance and Sexual Activity   Drug Use Never       Patient  reports being sexually active and has had partner(s) who are female. He reports using the following method of birth control/protection: Male Sterilization.    Family History[1]    No Known Allergies    Current Outpatient Medications   Medication Sig Dispense Refill    allopurinol (Zyloprim) 100 mg tablet Take 1 tablet (100 mg) by mouth Daily 90 tablet 1    amLODIPine (Norvasc) 5 mg tablet Take  1 tablet (5 mg) by mouth once daily. 90 tablet 3    apixaban (Eliquis) 5 mg tablet Take 1 tablet (5 mg) by mouth 2 times a day. 180 tablet 3    atorvastatin (Lipitor) 40 mg tablet Take 1 tablet (40 mg) by mouth once daily.      cholecalciferol (Vitamin D3) 10 mcg (400 units) tablet Take 1 tablet (10 mcg) by mouth once daily.      furosemide (Lasix) 20 mg tablet Take 1 tablet (20 mg) by mouth Daily 90 tablet 1    Lactobacillus acidophilus (PROBIOTIC ORAL) Take by mouth.      metoprolol succinate XL (Toprol-XL) 100 mg 24 hr tablet Take 1 tablet (100 mg) by mouth once daily. 90 tablet 3    multivitamin with minerals tablet Take 1 tablet by mouth once daily.      omeprazole OTC (PriLOSEC OTC) 20 mg EC tablet Take 1 tablet (20 mg) by mouth once daily in the morning. Take before meals. Do not crush, chew, or split.      propafenone (Rythmol) 150 mg tablet Take 1 tablet (150 mg) by mouth every 8 hours. 270 tablet 3    tadalafil (Cialis) 10 mg tablet Take 1 tablet (10 mg) by mouth once daily as needed for erectile dysfunction (Take 1/2 to 1 pill at least 30 minutes before sexual activity). 30 tablet 11    tamsulosin (Flomax) 0.4 mg 24 hr capsule Take 1 capsule (0.4 mg) by mouth once daily.      valsartan-hydrochlorothiazide (Diovan-HCT) 320-25 mg tablet Take 1 tablet by mouth Daily 90 tablet 1    blood-glucose sensor (FreeStyle Zhane 3 Plus Sensor) device Use 1 sensore change Every 15 Days 6 each 2    FreeStyle Zhane 3 Sensor device Change sensor every 14 (fourteen) days 6 each 3    FreeStyle Zhane 3 Sensor device Use to monitor blood sugar and change every 14 days. 6 each 3    FreeStyle Zhane 3 Sensor device Use daily and change every 14 days. 6 each 3    FreeStyle Zhane 3 Sensor device Apply 1 Unit every 14 (fourteen) days 6 each 3    metoprolol succinate XL (Toprol-XL) 100 mg 24 hr tablet Take 1 tablet (100 mg) by mouth once daily at the same time (Patient not taking: Reported on 7/16/2025) 90 tablet 1    semaglutide  (Ozempic) 2 mg/dose (8 mg/3 mL) pen injector Inject 2 mg under the skin every 7 (seven) days (Patient not taking: Reported on 7/16/2025) 6 mL 1    semaglutide (Ozempic) 2 mg/dose (8 mg/3 mL) pen injector Inject 2 mg under the skin every 7 (seven) days (Patient not taking: Reported on 7/16/2025) 6 mL 1    semaglutide (Ozempic) 2 mg/dose (8 mg/3 mL) pen injector Inject 2 mg under the skin every 7 days. (Patient taking differently: Inject 2 mg under the skin every 7 days. Sunday) 3 mL 2     No current facility-administered medications for this visit.     Review of Systems   Constitutional: Negative.    HENT: Negative.     Eyes: Negative.    Respiratory:  Positive for shortness of breath (SOB with Afib).    Cardiovascular:  Positive for palpitations.   Gastrointestinal: Negative.    Genitourinary: Negative.    Musculoskeletal: Negative.    Skin: Negative.    Neurological: Negative.    Hematological: Negative.    Psychiatric/Behavioral: Negative.       Physical Exam  Vitals reviewed.   Constitutional:       Appearance: He is morbidly obese.   HENT:      Head: Normocephalic and atraumatic.      Nose: Nose normal.      Mouth/Throat:      Mouth: Mucous membranes are moist.      Pharynx: Oropharynx is clear.     Eyes:      Extraocular Movements: Extraocular movements intact.      Conjunctiva/sclera: Conjunctivae normal.      Pupils: Pupils are equal, round, and reactive to light.       Cardiovascular:      Rate and Rhythm: Normal rate. Rhythm irregular.      Pulses: Normal pulses.      Heart sounds: Normal heart sounds.   Pulmonary:      Effort: Pulmonary effort is normal. No respiratory distress.      Breath sounds: Normal breath sounds. No wheezing, rhonchi or rales.      Comments: No conversational dyspnea  Abdominal:      Palpations: Abdomen is soft.     Musculoskeletal:         General: Normal range of motion.      Cervical back: Normal range of motion and neck supple.     Skin:     General: Skin is warm and dry.       "Comments: Chronic skin discoloration to BLE     Neurological:      General: No focal deficit present.      Mental Status: He is alert and oriented to person, place, and time. Mental status is at baseline.     Psychiatric:         Mood and Affect: Mood normal.         Behavior: Behavior normal.         Thought Content: Thought content normal.         Judgment: Judgment normal.          PAT AIRWAY:   Airway:     Mallampati::  III    TM distance::  >3 FB    Neck ROM::  Full  normal      Visit Vitals  /84   Pulse 87   Temp 36.2 °C (97.2 °F) (Temporal)   Resp 18   Ht 1.727 m (5' 8\")   Wt 145 kg (320 lb 5.3 oz)   SpO2 99%   BMI 48.71 kg/m²   Smoking Status Never   BSA 2.64 m²     ASA: 3   CHADS:  2.8%  RCRI: 0.4%  Ariscat: 1.6%  DASI Risk Score      Flowsheet Row Pre-Admission Testing from 7/16/2025 in [facility]   Can you take care of yourself (eat, dress, bathe, or use toilet)?  2.75 filed at 07/16/2025 1522   Can you walk indoors, such as around your house? 1.75 filed at 07/16/2025 1522   Can you walk a block or two on level ground?  2.75 filed at 07/16/2025 1522   Can you climb a flight of stairs or walk up a hill? 5.5 filed at 07/16/2025 1522   Can you run a short distance? 8 filed at 07/16/2025 1522   Can you do light work around the house like dusting or washing dishes? 2.7 filed at 07/16/2025 1522   Can you do moderate work around the house like vacuuming, sweeping floors or carrying groceries? 3.5 filed at 07/16/2025 1522   Can you do heavy work around the house like scrubbing floors or lifting and moving heavy furniture?  8 filed at 07/16/2025 1522   Can you do yard work like raking leaves, weeding or pushing a mower? 4.5 filed at 07/16/2025 1522   Can you have sexual relations? 5.25 filed at 07/16/2025 1522   Can you participate in moderate recreational activities like golf, bowling, dancing, doubles tennis or throwing a baseball or football? 6 filed at 07/16/2025 1522   Can you " participate in strenous sports like swimming, singles tennis, football, basketball, or skiing? 7.5 filed at 07/16/2025 1522   DASI SCORE 58.2 filed at 07/16/2025 1522   METS Score (Will be calculated only when all the questions are answered) 9.9 filed at 07/16/2025 1522     Caprini DVT Assessment      Flowsheet Row ED to Hosp-Admission (Discharged) from 6/19/2025 in [facility] with Ronal Arnold DO and Michelle Munguia MD   DVT Score (IF A SCORE IS NOT CALCULATING, MUST SELECT A BMI TO COMPLETE) 4 filed at 06/20/2025 1223   BMI (BMI MUST BE CHOSEN) 41-50 (Morbid obesity) filed at 06/20/2025 1223     Modified Frailty Index    No data to display  [id number] Stroke Risk Points  Current as of just now        2 0 to 9 Points:      No Change          The [id number] risk score (Lip GH, et al. 2009. © 2010 American College [employer]) quantifies the risk of stroke for a patient with atrial fibrillation. For patients without atrial fibrillation or under the age of 18 this score appears as N/A. Higher score values generally indicate higher risk of stroke.          Points Metrics   0 Has Congestive Heart Failure:  No     Patients with congestive heart failure get 1 point.    Current as of just now   1 Has Hypertension:  Yes     Patients with hypertension get 1 point.    Current as of just now   0 Age:  54     Patients 65 to 74 years old get 1 point, or patients 75 years and older get 2 points.    Current as of just now   1 Has Diabetes:  Yes     Patients with diabetes get 1 point.    Current as of just now   0 Had Stroke:  No  Had TIA:  No  Had Thromboembolism:  No     Patients who have had a stroke, TIA, or thromboembolism get 2 points.    Current as of just now   0 Has Vascular Disease:  No     Patients with vascular disease get 1 point.    Current as of just now   0 Clinically Relevant Sex:  Male     Patients with a clinically relevant sex of Female get 1 point.    Current as of just now              Revised Cardiac Risk Index      Flowsheet Row Pre-Admission Testing from 7/16/2025 in [facility]   High-Risk Surgery (Intraperitoneal, Intrathoracic,Suprainguinal vascular) 0 filed at 07/16/2025 1608   History of ischemic heart disease (History of MI, History of positive exercuse test, Current chest paint considered due to myocardial ischemia, Use of nitrate therapy, ECG with pathological Q Waves) 0 filed at 07/16/2025 1608   History of congestive heart failure (pulmonary edemia, bilateral rales or S3 gallop, Paroxysmal nocturnal dyspnea, CXR showing pulmonary vascular redistribution) 0 filed at 07/16/2025 1608   History of cerebrovascular disease (Prior TIA or stroke) 0 filed at 07/16/2025 1608   Pre-operative insulin treatment 0 filed at 07/16/2025 1608   Pre-operative creatinine>2 mg/dl 0 filed at 07/16/2025 1608   Revised Cardiac Risk Calculator 0 filed at 07/16/2025 1608     Apfel Simplified Score    No data to display  Risk Analysis Index Results This Encounter    No data found in the last 10 encounters.       Stop Bang Score      Flowsheet Row Pre-Admission Testing from 7/16/2025 in [facility]   Do you snore loudly? 1 filed at 07/16/2025 1522   Do you often feel tired or fatigued after your sleep? 0 filed at 07/16/2025 1522   Has anyone ever observed you stop breathing in your sleep? 1 filed at 07/16/2025 1522   Do you have or are you being treated for high blood pressure? 1 filed at 07/16/2025 1522   Recent BMI (Calculated) 48.7 filed at 07/16/2025 1522   Is BMI greater than 35 kg/m2? 1=Yes filed at 07/16/2025 1522   Age older than 50 years old? 1=Yes filed at 07/16/2025 1522   Is your neck circumference greater than 17 inches (Male) or 16 inches (Female)? 1 filed at 07/16/2025 1522   Gender - Male 1=Yes filed at 07/16/2025 1522   STOP-BANG Total Score 7 filed at 07/16/2025 1522     Prodigy: High Risk  Total Score: 8              Prodigy Gender Score          ARISCAT  Score for Postoperative Pulmonary Complications    No data to display  Locktet Perioperative Risk for Myocardial Infarction or Cardiac Arrest (BRANNON)    No data to display      Assessment and Plan:     Atrial fibrillation with rapid ventricular response: Ablation A-Fib PFA   HTN: Pt is taking amlodipine, metoprolol succinate  XL, and furosemide.    SHONA: compliant with CPAP use .   Prediabetic: 7/2/2025  HgbA1C: 5.6  GERD: pt is taking omeprazole.   GOUT: Pt is taking allopurinol.   Venous insufficiency: Pt is taking furosemide.   BMI: 48.71; Pt was taking Ozempic for weight loss.   History of Squamous cell carcinoma lip cancers  s/p lip lesion excision and neck disection for metastatic cancer. Pt was also treated with radiation.     CBC, BMP, and T&S collected in PAT.  EKG completed on 6/20/2025 6/19/2025 ECHO:  CONCLUSIONS:   1. The left ventricular systolic function is normal with a visually estimated ejection fraction of 65-70%.   2. There is normal right ventricular global systolic function.   3. The Doppler estimated RVSP is within normal limits at 21 mmHg.   4. Normal left and right atrial size.   5. There is moderately increased septal and mildly increased posterior left ventricular wall thickness.    Ananya Burrell, ELVIRA-CNP           [1] No family history on file.

## 2025-07-17 LAB
ANION GAP SERPL CALCULATED.4IONS-SCNC: 12 MMOL/L (CALC) (ref 7–17)
BUN SERPL-MCNC: 18 MG/DL (ref 7–25)
BUN/CREAT SERPL: NORMAL (CALC) (ref 6–22)
CALCIUM SERPL-MCNC: 9.1 MG/DL (ref 8.6–10.3)
CHLORIDE SERPL-SCNC: 100 MMOL/L (ref 98–110)
CO2 SERPL-SCNC: 28 MMOL/L (ref 20–32)
CREAT SERPL-MCNC: 1.04 MG/DL (ref 0.7–1.3)
EGFRCR SERPLBLD CKD-EPI 2021: 86 ML/MIN/1.73M2
ERYTHROCYTE [DISTWIDTH] IN BLOOD BY AUTOMATED COUNT: 15 % (ref 11–15)
GLUCOSE SERPL-MCNC: 96 MG/DL (ref 65–99)
HCT VFR BLD AUTO: 48.8 % (ref 38.5–50)
HGB BLD-MCNC: 16 G/DL (ref 13.2–17.1)
MCH RBC QN AUTO: 27 PG (ref 27–33)
MCHC RBC AUTO-ENTMCNC: 32.8 G/DL (ref 32–36)
MCV RBC AUTO: 82.4 FL (ref 80–100)
PLATELET # BLD AUTO: 189 THOUSAND/UL (ref 140–400)
PMV BLD REES-ECKER: 9.9 FL (ref 7.5–12.5)
POTASSIUM SERPL-SCNC: 3.9 MMOL/L (ref 3.5–5.3)
RBC # BLD AUTO: 5.92 MILLION/UL (ref 4.2–5.8)
SODIUM SERPL-SCNC: 140 MMOL/L (ref 135–146)
WBC # BLD AUTO: 9.8 THOUSAND/UL (ref 3.8–10.8)

## 2025-07-18 PROCEDURE — RXMED WILLOW AMBULATORY MEDICATION CHARGE

## 2025-07-19 ENCOUNTER — PHARMACY VISIT (OUTPATIENT)
Dept: PHARMACY | Facility: CLINIC | Age: 55
End: 2025-07-19
Payer: COMMERCIAL

## 2025-07-23 ENCOUNTER — ANESTHESIA (OUTPATIENT)
Dept: CARDIOLOGY | Facility: HOSPITAL | Age: 55
End: 2025-07-23
Payer: MEDICARE

## 2025-07-23 ENCOUNTER — APPOINTMENT (OUTPATIENT)
Dept: CARDIOLOGY | Facility: HOSPITAL | Age: 55
End: 2025-07-23
Payer: MEDICARE

## 2025-07-23 ENCOUNTER — HOSPITAL ENCOUNTER (OUTPATIENT)
Facility: HOSPITAL | Age: 55
Setting detail: OUTPATIENT SURGERY
Discharge: HOME | End: 2025-07-23
Attending: INTERNAL MEDICINE | Admitting: INTERNAL MEDICINE
Payer: MEDICARE

## 2025-07-23 ENCOUNTER — ANESTHESIA EVENT (OUTPATIENT)
Dept: CARDIOLOGY | Facility: HOSPITAL | Age: 55
End: 2025-07-23
Payer: MEDICARE

## 2025-07-23 VITALS
HEART RATE: 84 BPM | TEMPERATURE: 96.8 F | RESPIRATION RATE: 20 BRPM | HEIGHT: 68 IN | WEIGHT: 315 LBS | BODY MASS INDEX: 47.74 KG/M2 | SYSTOLIC BLOOD PRESSURE: 121 MMHG | OXYGEN SATURATION: 96 % | DIASTOLIC BLOOD PRESSURE: 76 MMHG

## 2025-07-23 DIAGNOSIS — I48.91 ATRIAL FIBRILLATION WITH RAPID VENTRICULAR RESPONSE (MULTI): Primary | ICD-10-CM

## 2025-07-23 PROBLEM — K21.9 GASTROESOPHAGEAL REFLUX DISEASE: Status: ACTIVE | Noted: 2025-07-23

## 2025-07-23 LAB
ATRIAL RATE: 77 BPM
GLUCOSE BLD MANUAL STRIP-MCNC: 103 MG/DL (ref 74–99)
P AXIS: 48 DEGREES
P OFFSET: 176 MS
P ONSET: 118 MS
PR INTERVAL: 184 MS
Q ONSET: 210 MS
QRS COUNT: 12 BEATS
QRS DURATION: 100 MS
QT INTERVAL: 380 MS
QTC CALCULATION(BAZETT): 430 MS
QTC FREDERICIA: 412 MS
R AXIS: 49 DEGREES
T AXIS: 37 DEGREES
T OFFSET: 400 MS
VENTRICULAR RATE: 77 BPM

## 2025-07-23 PROCEDURE — 85347 COAGULATION TIME ACTIVATED: CPT

## 2025-07-23 PROCEDURE — C1730 CATH, EP, 19 OR FEW ELECT: HCPCS | Performed by: INTERNAL MEDICINE

## 2025-07-23 PROCEDURE — 2500000004 HC RX 250 GENERAL PHARMACY W/ HCPCS (ALT 636 FOR OP/ED): Performed by: ANESTHESIOLOGIST ASSISTANT

## 2025-07-23 PROCEDURE — 2720000007 HC OR 272 NO HCPCS: Performed by: INTERNAL MEDICINE

## 2025-07-23 PROCEDURE — 93656 COMPRE EP EVAL ABLTJ ATR FIB: CPT | Performed by: INTERNAL MEDICINE

## 2025-07-23 PROCEDURE — A93656 PR EPHYS EVL TRNSPTL TX ATRIAL FIB ISOLAT PULM VEIN: Performed by: ANESTHESIOLOGY

## 2025-07-23 PROCEDURE — 2500000004 HC RX 250 GENERAL PHARMACY W/ HCPCS (ALT 636 FOR OP/ED): Performed by: INTERNAL MEDICINE

## 2025-07-23 PROCEDURE — C1887 CATHETER, GUIDING: HCPCS | Performed by: INTERNAL MEDICINE

## 2025-07-23 PROCEDURE — C1760 CLOSURE DEV, VASC: HCPCS | Performed by: INTERNAL MEDICINE

## 2025-07-23 PROCEDURE — A93656 PR EPHYS EVL TRNSPTL TX ATRIAL FIB ISOLAT PULM VEIN: Performed by: ANESTHESIOLOGIST ASSISTANT

## 2025-07-23 PROCEDURE — 2500000001 HC RX 250 WO HCPCS SELF ADMINISTERED DRUGS (ALT 637 FOR MEDICARE OP): Performed by: NURSE PRACTITIONER

## 2025-07-23 PROCEDURE — 2780000003 HC OR 278 NO HCPCS: Performed by: INTERNAL MEDICINE

## 2025-07-23 PROCEDURE — 7100000010 HC PHASE TWO TIME - EACH INCREMENTAL 1 MINUTE: Performed by: INTERNAL MEDICINE

## 2025-07-23 PROCEDURE — C1766 INTRO/SHEATH,STRBLE,NON-PEEL: HCPCS | Performed by: INTERNAL MEDICINE

## 2025-07-23 PROCEDURE — 93005 ELECTROCARDIOGRAM TRACING: CPT

## 2025-07-23 PROCEDURE — 93657 TX L/R ATRIAL FIB ADDL: CPT | Performed by: INTERNAL MEDICINE

## 2025-07-23 PROCEDURE — 7100000001 HC RECOVERY ROOM TIME - INITIAL BASE CHARGE: Performed by: INTERNAL MEDICINE

## 2025-07-23 PROCEDURE — 36415 COLL VENOUS BLD VENIPUNCTURE: CPT | Performed by: NURSE PRACTITIONER

## 2025-07-23 PROCEDURE — C1733 CATH, EP, OTHR THAN COOL-TIP: HCPCS | Performed by: INTERNAL MEDICINE

## 2025-07-23 PROCEDURE — 7100000002 HC RECOVERY ROOM TIME - EACH INCREMENTAL 1 MINUTE: Performed by: INTERNAL MEDICINE

## 2025-07-23 PROCEDURE — 3700000002 HC GENERAL ANESTHESIA TIME - EACH INCREMENTAL 1 MINUTE: Performed by: INTERNAL MEDICINE

## 2025-07-23 PROCEDURE — 82947 ASSAY GLUCOSE BLOOD QUANT: CPT

## 2025-07-23 PROCEDURE — C1759 CATH, INTRA ECHOCARDIOGRAPHY: HCPCS | Performed by: INTERNAL MEDICINE

## 2025-07-23 PROCEDURE — 3700000001 HC GENERAL ANESTHESIA TIME - INITIAL BASE CHARGE: Performed by: INTERNAL MEDICINE

## 2025-07-23 PROCEDURE — 7100000009 HC PHASE TWO TIME - INITIAL BASE CHARGE: Performed by: INTERNAL MEDICINE

## 2025-07-23 RX ORDER — LIDOCAINE HYDROCHLORIDE 10 MG/ML
INJECTION, SOLUTION INFILTRATION; PERINEURAL AS NEEDED
Status: DISCONTINUED | OUTPATIENT
Start: 2025-07-23 | End: 2025-07-23

## 2025-07-23 RX ORDER — OXYCODONE HYDROCHLORIDE 5 MG/1
5 TABLET ORAL EVERY 4 HOURS PRN
Status: DISCONTINUED | OUTPATIENT
Start: 2025-07-23 | End: 2025-07-23 | Stop reason: HOSPADM

## 2025-07-23 RX ORDER — FENTANYL CITRATE 50 UG/ML
INJECTION, SOLUTION INTRAMUSCULAR; INTRAVENOUS AS NEEDED
Status: DISCONTINUED | OUTPATIENT
Start: 2025-07-23 | End: 2025-07-23

## 2025-07-23 RX ORDER — ONDANSETRON HYDROCHLORIDE 2 MG/ML
4 INJECTION, SOLUTION INTRAVENOUS ONCE AS NEEDED
Status: DISCONTINUED | OUTPATIENT
Start: 2025-07-23 | End: 2025-07-23 | Stop reason: HOSPADM

## 2025-07-23 RX ORDER — LIDOCAINE HYDROCHLORIDE 10 MG/ML
0.1 INJECTION, SOLUTION INFILTRATION; PERINEURAL ONCE
Status: DISCONTINUED | OUTPATIENT
Start: 2025-07-23 | End: 2025-07-23 | Stop reason: HOSPADM

## 2025-07-23 RX ORDER — ACETAMINOPHEN 325 MG/1
650 TABLET ORAL EVERY 4 HOURS PRN
Status: DISCONTINUED | OUTPATIENT
Start: 2025-07-23 | End: 2025-07-23 | Stop reason: HOSPADM

## 2025-07-23 RX ORDER — HEPARIN SODIUM 10000 [USP'U]/100ML
INJECTION, SOLUTION INTRAVENOUS CONTINUOUS PRN
Status: DISCONTINUED | OUTPATIENT
Start: 2025-07-23 | End: 2025-07-23

## 2025-07-23 RX ORDER — PHENYLEPHRINE HCL IN 0.9% NACL 1 MG/10 ML
SYRINGE (ML) INTRAVENOUS AS NEEDED
Status: DISCONTINUED | OUTPATIENT
Start: 2025-07-23 | End: 2025-07-23

## 2025-07-23 RX ORDER — SODIUM CHLORIDE, SODIUM LACTATE, POTASSIUM CHLORIDE, CALCIUM CHLORIDE 600; 310; 30; 20 MG/100ML; MG/100ML; MG/100ML; MG/100ML
INJECTION, SOLUTION INTRAVENOUS CONTINUOUS PRN
Status: DISCONTINUED | OUTPATIENT
Start: 2025-07-23 | End: 2025-07-23

## 2025-07-23 RX ORDER — ONDANSETRON HYDROCHLORIDE 2 MG/ML
INJECTION, SOLUTION INTRAVENOUS AS NEEDED
Status: DISCONTINUED | OUTPATIENT
Start: 2025-07-23 | End: 2025-07-23

## 2025-07-23 RX ORDER — ROCURONIUM BROMIDE 10 MG/ML
INJECTION, SOLUTION INTRAVENOUS AS NEEDED
Status: DISCONTINUED | OUTPATIENT
Start: 2025-07-23 | End: 2025-07-23

## 2025-07-23 RX ORDER — ONDANSETRON HYDROCHLORIDE 2 MG/ML
4 INJECTION, SOLUTION INTRAVENOUS EVERY 8 HOURS PRN
Status: DISCONTINUED | OUTPATIENT
Start: 2025-07-23 | End: 2025-07-23 | Stop reason: HOSPADM

## 2025-07-23 RX ORDER — FENTANYL CITRATE 50 UG/ML
25 INJECTION, SOLUTION INTRAMUSCULAR; INTRAVENOUS EVERY 5 MIN PRN
Status: DISCONTINUED | OUTPATIENT
Start: 2025-07-23 | End: 2025-07-23 | Stop reason: HOSPADM

## 2025-07-23 RX ORDER — BUPIVACAINE HYDROCHLORIDE 2.5 MG/ML
INJECTION, SOLUTION EPIDURAL; INFILTRATION; INTRACAUDAL; PERINEURAL AS NEEDED
Status: DISCONTINUED | OUTPATIENT
Start: 2025-07-23 | End: 2025-07-23 | Stop reason: HOSPADM

## 2025-07-23 RX ORDER — LABETALOL HYDROCHLORIDE 5 MG/ML
5 INJECTION, SOLUTION INTRAVENOUS ONCE AS NEEDED
Status: DISCONTINUED | OUTPATIENT
Start: 2025-07-23 | End: 2025-07-23 | Stop reason: HOSPADM

## 2025-07-23 RX ORDER — HEPARIN SODIUM 1000 [USP'U]/ML
INJECTION, SOLUTION INTRAVENOUS; SUBCUTANEOUS AS NEEDED
Status: DISCONTINUED | OUTPATIENT
Start: 2025-07-23 | End: 2025-07-23

## 2025-07-23 RX ORDER — FENTANYL CITRATE 50 UG/ML
50 INJECTION, SOLUTION INTRAMUSCULAR; INTRAVENOUS EVERY 5 MIN PRN
Status: DISCONTINUED | OUTPATIENT
Start: 2025-07-23 | End: 2025-07-23 | Stop reason: HOSPADM

## 2025-07-23 RX ORDER — ONDANSETRON 4 MG/1
4 TABLET, FILM COATED ORAL EVERY 8 HOURS PRN
Status: DISCONTINUED | OUTPATIENT
Start: 2025-07-23 | End: 2025-07-23 | Stop reason: HOSPADM

## 2025-07-23 RX ORDER — PROPOFOL 10 MG/ML
INJECTION, EMULSION INTRAVENOUS AS NEEDED
Status: DISCONTINUED | OUTPATIENT
Start: 2025-07-23 | End: 2025-07-23

## 2025-07-23 RX ADMIN — SUGAMMADEX 200 MG: 100 INJECTION, SOLUTION INTRAVENOUS at 15:38

## 2025-07-23 RX ADMIN — ONDANSETRON HYDROCHLORIDE 4 MG: 2 INJECTION INTRAMUSCULAR; INTRAVENOUS at 14:37

## 2025-07-23 RX ADMIN — ROCURONIUM BROMIDE 50 MG: 10 INJECTION, SOLUTION INTRAVENOUS at 14:35

## 2025-07-23 RX ADMIN — LIDOCAINE HYDROCHLORIDE 5 ML: 10 INJECTION, SOLUTION INFILTRATION; PERINEURAL at 14:35

## 2025-07-23 RX ADMIN — HEPARIN SODIUM 5000 UNITS: 1000 INJECTION INTRAVENOUS; SUBCUTANEOUS at 15:05

## 2025-07-23 RX ADMIN — PROPOFOL 200 MG: 10 INJECTION, EMULSION INTRAVENOUS at 14:35

## 2025-07-23 RX ADMIN — ROCURONIUM BROMIDE 10 MG: 10 INJECTION, SOLUTION INTRAVENOUS at 14:52

## 2025-07-23 RX ADMIN — SUGAMMADEX 200 MG: 100 INJECTION, SOLUTION INTRAVENOUS at 15:36

## 2025-07-23 RX ADMIN — ROCURONIUM BROMIDE 10 MG: 10 INJECTION, SOLUTION INTRAVENOUS at 15:08

## 2025-07-23 RX ADMIN — SODIUM CHLORIDE, POTASSIUM CHLORIDE, SODIUM LACTATE AND CALCIUM CHLORIDE: 600; 310; 30; 20 INJECTION, SOLUTION INTRAVENOUS at 14:27

## 2025-07-23 RX ADMIN — HEPARIN SODIUM 2000 UNITS: 1000 INJECTION INTRAVENOUS; SUBCUTANEOUS at 15:21

## 2025-07-23 RX ADMIN — Medication 100 MCG: at 15:13

## 2025-07-23 RX ADMIN — FENTANYL CITRATE 50 MCG: 50 INJECTION, SOLUTION INTRAMUSCULAR; INTRAVENOUS at 14:35

## 2025-07-23 RX ADMIN — ACETAMINOPHEN 650 MG: 325 TABLET ORAL at 16:58

## 2025-07-23 RX ADMIN — ROCURONIUM BROMIDE 20 MG: 10 INJECTION, SOLUTION INTRAVENOUS at 14:43

## 2025-07-23 RX ADMIN — FENTANYL CITRATE 50 MCG: 50 INJECTION, SOLUTION INTRAMUSCULAR; INTRAVENOUS at 14:44

## 2025-07-23 RX ADMIN — HEPARIN SODIUM 6.89 UNITS/KG/HR: 10000 INJECTION, SOLUTION INTRAVENOUS at 15:05

## 2025-07-23 RX ADMIN — Medication 150 MCG: at 15:16

## 2025-07-23 ASSESSMENT — PAIN SCALES - GENERAL
PAINLEVEL_OUTOF10: 0 - NO PAIN
PAINLEVEL_OUTOF10: 6
PAINLEVEL_OUTOF10: 0 - NO PAIN

## 2025-07-23 ASSESSMENT — PAIN - FUNCTIONAL ASSESSMENT
PAIN_FUNCTIONAL_ASSESSMENT: 0-10

## 2025-07-23 ASSESSMENT — COLUMBIA-SUICIDE SEVERITY RATING SCALE - C-SSRS: 6. HAVE YOU EVER DONE ANYTHING, STARTED TO DO ANYTHING, OR PREPARED TO DO ANYTHING TO END YOUR LIFE?: NO

## 2025-07-23 NOTE — PERIOPERATIVE NURSING NOTE
Patient received to [facility] [place] # 6 from OR. Anesthesia at bedside. Report received. Initial assessment complete. VSS on Cardiac Monitor. Patient denies pain. Resting comfortably at present. No S/S of Resp. Distress noted.

## 2025-07-23 NOTE — ANESTHESIA PREPROCEDURE EVALUATION
Patient: Chinmay Tee    Procedure Information       Date/Time: 07/23/25 1400    Procedure: Ablation A-Fib PFA - AF PFA/ ENSITE/GA/ CPT 85310    Location: [facility] CATH LAB 2 (EP) / Virtual [facility] Cardiac Cath Lab    Providers: Juvencio Bedolla MD          No care team member to display  Medical History[1]   Relevant Problems   Cardiac   (+) Atrial fibrillation with rapid ventricular response (Multi)   (+) Essential hypertension   (+) Other hyperlipidemia   (+) Paroxysmal atrial fibrillation (Multi)      Pulmonary   (+) SHONA (obstructive sleep apnea)      GI   (+) Gastroesophageal reflux disease      Endocrine   (+) Obesity   (+) Type 2 diabetes mellitus, with long-term current use of insulin   Surgical History[2]   Echo 6/19/25:  CONCLUSIONS:   1. The left ventricular systolic function is normal with a visually estimated ejection fraction of 65-70%.   2. There is normal right ventricular global systolic function.   3. The Doppler estimated RVSP is within normal limits at 21 mmHg.   4. Normal left and right atrial size.   5. There is moderately increased septal and mildly increased posterior left ventricular wall thickness.    Clinical information reviewed:                   NPO Detail:  No data recorded     Physical Exam    Airway  Mallampati: III  TM distance: >3 FB  Neck ROM: full     Cardiovascular    Dental    Pulmonary    Abdominal            Anesthesia Plan    History of general anesthesia?: yes  History of complications of general anesthesia?: no    ASA 3     general     intravenous induction   Anesthetic plan and risks discussed with patient.    Plan discussed with CAA.           [1]   Past Medical History:  Diagnosis Date    Atrial fibrillation (Multi)     GERD (gastroesophageal reflux disease)     Gout     Hyperlipidemia     Hypertension     Prediabetes     Sleep apnea     Squamous cell carcinoma, lip    [2]   Past Surgical History:  Procedure Laterality Date    CARDIAC ELECTROPHYSIOLOGY PROCEDURE N/A 06/20/2025     Procedure: Cardioversion;  Surgeon: Ronal Arnold DO;  Location: [facility] Cardiac Cath Lab;  Service: Cardiovascular;  Laterality: N/A;    COLONOSCOPY      LUMBAR SPINE SURGERY      OTHER SURGICAL HISTORY      wedge excision of lower lip skin lesion    OTHER SURGICAL HISTORY Left     neck disection for metastatic cancer    TYMPANOSTOMY TUBE PLACEMENT      VASECTOMY

## 2025-07-23 NOTE — ANESTHESIA PROCEDURE NOTES
Airway  Date/Time: 7/23/2025 2:36 PM  Reason: elective    Airway not difficult    Staffing  Performed: JOHNNY   Authorized by: Braden Lainez MD    Performed by: JOHNNY Tamayo  Patient location during procedure: OR    Patient Condition  Indications for airway management: anesthesia and airway protection  Patient position: sniffing  Planned trial extubation  Sedation level: deep     Final Airway Details   Preoxygenated: yes  Final airway type: endotracheal airway  Successful airway: ETT  Cuffed: yes   Successful intubation technique: direct laryngoscopy  Adjuncts used in placement: intubating stylet  Endotracheal tube insertion site: oral  Blade: Golden  Blade size: #3  ETT size (mm): 7.5  Cormack-Lehane Classification: grade III - view of epiglottis only  Placement verified by: chest auscultation, capnometry and palpation of cuff   Measured from: lips  ETT to lips (cm): 22  Number of attempts at approach: 1

## 2025-07-23 NOTE — PERIOPERATIVE NURSING NOTE
Patient meets [facility] Discharge Criteria.    1644: Patient transferred to [facility] Status Stable.

## 2025-07-23 NOTE — POST-PROCEDURE NOTE
Physician Transition of Care Summary  Invasive Cardiovascular Lab    Procedure Date: 7/23/2025  Attending:    * Juvencio Bedolla - Primary  Resident/Fellow/Other Assistant: Surgeons and Role:  * No surgeons found with a matching role *    Indications:   Pre-op Diagnosis      * Atrial fibrillation with rapid ventricular response (Multi) [I48.91]    Post-procedure diagnosis:   Post-op Diagnosis     * Atrial fibrillation with rapid ventricular response (Multi) [I48.91]    Procedure(s):   Ablation A-Fib PFA  70477 - CA COMPRE EP EVAL ABLTJ ATR FIB PULM VEIN ISOLATION        Procedure Findings:   See below    Description of the Procedure:   After written witnessed informed consent was obtained the procedure from the patient, the patient was transferred to the EP laboratory. The patient was in the fasting state. A grounding pad was placed. The patient was set up for monitoring of surface ECG and intracardiac electrograms. 3-D mapping patches were placed. The right and left groins were prepped and draped in the usual sterile fashion. Bupivacaine was administered locally for anesthetic. Right and Left  Femoral vein access was obtained. The vessel was accessed using the modified Seldinger technique.       General anesthesia was utilized    The catheters were positioned as follows:  Left Femoral Vein - 10 Fr Intracardiac Echocardiography catheter  Left Femoral Vein - 6 Fr Kely quadripolar RV catheter  RightFemoral Vein - 6 Fr coronary sinus EPXT octopolar catheter  Right Femoral Vein - 6 Fr SLO long sheath through a short 12 Fr sheath, and the FARAWAVE steerable sheath, with the FARAWAVE ablation catheter            Trans septal catheterization was performed under biplane fluoroscopic and intracardiac echocardiographic guidance. Heparin bolus and continuous IV infusion were given immediately following trans septal puncture. An ACT was maintained around 300 throughout the procedure with assessments between 20 and 30  minutes apart throughout the procedure.  The SL 0 long sheath was exchanged over a wire for the [place] Opiatalk Faradrive catheter long sheath.The Farapulse catheter system was then advanced to the left atrium.   Mapping performed with the FARAWAVE catheter   A Richardson inner guidewire  was advanced through the  catheter. A three dimensional map was recreated of the left atrium performed utilizing the [employer] Kimmy mapping system. All 4 veins were ablated utilizing pulsed field ablation with 8 lesions per vein.  4 lesions were performed in the flower position and 4 lesions in the basket position.  Additionally several lesions were placed left atrial roof, posterior wall and anterior mitral isthmus.  Tissue contact was verified utilizing intracardiac echocardiography as well as mapping software.  There were no complications    Vascade closure device was utilized for all venous access sites      Summary  Successful pulmonary vein isolation with pulsed field ablation (FARAPULSE) with further left atrial substrate modification as outlined.     Complications:   none    Stents/Implants:   Implants       No implant documentation for this case.            Anticoagulation/Antiplatelet Plan:   Heparin    Estimated Blood Loss:   10 mL    Anesthesia: General Anesthesia Staff: Anesthesiologist: Braden Lainez MD  C-AA: JOHNNY Tamayo    Any Specimen(s) Removed:   No specimens collected during this procedure.    Disposition:   stable      Electronically signed by: Juvencio Bedolla MD, 7/23/2025 3:35 PM

## 2025-07-24 LAB — ACT BLD: 193 SEC (ref 89–169)

## 2025-07-24 ASSESSMENT — PAIN SCALES - GENERAL: PAINLEVEL_OUTOF10: 2

## 2025-08-09 PROCEDURE — RXMED WILLOW AMBULATORY MEDICATION CHARGE

## 2025-08-14 ENCOUNTER — PHARMACY VISIT (OUTPATIENT)
Dept: PHARMACY | Facility: CLINIC | Age: 55
End: 2025-08-14
Payer: COMMERCIAL

## (undated) DEVICE — GUIDEWIRE, AMPLATZ, TFE, EXTRA STIFF, CURVED, .035/180CM/3MMTIP

## (undated) DEVICE — COVER, EQUIPMENT, ZERO GRAVITY

## (undated) DEVICE — CATHETER, ELECTRODE, STEERABLE, EP-XT, LARGE CURVE, 6 FR X 110 CM

## (undated) DEVICE — PAD, ELECTRODE DEFIB PADPRO ADULT STRL W/ADAPTER

## (undated) DEVICE — Device

## (undated) DEVICE — CLOSURE SYSTEM, VASCADE MVP XL, 10-12FR

## (undated) DEVICE — INTRODUCER, SHEATH, FAST-CATH, 7FR X 12CM, C-LOCK

## (undated) DEVICE — SHEATH, STEERABLE, FARADRIVE, CLEAR

## (undated) DEVICE — CATHETER, ABLATION, PULSED FIELD, FARAWAVE 31 MM

## (undated) DEVICE — CATHETER, VIEW FLEX XTRA ICE, 9FR

## (undated) DEVICE — GUIDEWIRE, ROSEN, CURVED, 0.035/180CM/1.5MM

## (undated) DEVICE — CATHETER, ELCTROPHYSIOLOGY, DIAGNOSTIC, SUPREME, 4 ELECTRODE, 2-5-2 MM SPACING, JOSEPHSON CURVE, 6 FR X 120 CM

## (undated) DEVICE — CABLE, OCTAPOLAR, EASYMATE 8 125CML

## (undated) DEVICE — INTRODUCER SET, ULTIMUM IV, 12FR X 12CM

## (undated) DEVICE — ELECTRODE KIT, ENSITE X EP SYSTEM SURFACE

## (undated) DEVICE — INTRODUCER, SHEATH, AVANTI, 10 FR X 11 CM

## (undated) DEVICE — CABLE CONNECTION, CATHETER, PFA RX-HRD

## (undated) DEVICE — INTRODUCER, SHEATH, FAST-CATH, 6 FR X 23 CM

## (undated) DEVICE — CLOSURE SYSTEM, VASCULAR, MVP 6-12FR, VENOUS

## (undated) DEVICE — PACK, ANGIO P2, CUSTOM, LAKE